# Patient Record
Sex: MALE | Race: WHITE | Employment: FULL TIME | URBAN - METROPOLITAN AREA
[De-identification: names, ages, dates, MRNs, and addresses within clinical notes are randomized per-mention and may not be internally consistent; named-entity substitution may affect disease eponyms.]

---

## 2019-06-24 ENCOUNTER — TELEPHONE (OUTPATIENT)
Dept: PSYCHIATRY | Facility: CLINIC | Age: 34
End: 2019-06-24

## 2019-06-24 NOTE — TELEPHONE ENCOUNTER
PSYCHIATRY CLINIC PHONE INTAKE     SERVICES REQUESTED / INTERESTED IN          Med Management    Presenting Problem and Brief History                              What would you like to be seen for? (brief description):  The patient recently moved to the [facility], and he wants to establish care with psychiatry to get his ADHD under control. He was diagnosed when he was 26 and said that he had testing done. He experiences issues with attention that lead to depression and anxiety.   Have you received a mental health diagnosis? Yes   Which one (s): ADHD  Is there any history of developmental delay?  No   Are you currently seeing a mental health provider?  No             Do you have mental health records elsewhere?  Yes  Will you sign a release so we can obtain them?  Yes    Have you ever been hospitalized for psychiatric reasons?  No      Do you have current thoughts of self-harm?  No    Do you currently have thoughts of harming others?  No       Substance Use History     Do you have any history of alcohol / illicit drug use?  No      Social History     Do you have any current or past legal issues?  No    OK to leave a detailed voicemail?  Yes    Medical/ Surgical History                                  None    Medications             None    DISPOSITION      Completed phone screen with patient. Added to AGE wait list.    -Gretchen Davis,

## 2019-08-15 ENCOUNTER — OFFICE VISIT (OUTPATIENT)
Dept: FAMILY MEDICINE | Facility: CLINIC | Age: 34
End: 2019-08-15
Payer: COMMERCIAL

## 2019-08-15 VITALS
HEIGHT: 71 IN | BODY MASS INDEX: 26.6 KG/M2 | DIASTOLIC BLOOD PRESSURE: 66 MMHG | OXYGEN SATURATION: 98 % | TEMPERATURE: 98.6 F | HEART RATE: 58 BPM | SYSTOLIC BLOOD PRESSURE: 112 MMHG | WEIGHT: 190 LBS

## 2019-08-15 DIAGNOSIS — F32.0 MILD MAJOR DEPRESSION (H): ICD-10-CM

## 2019-08-15 DIAGNOSIS — Z00.00 ROUTINE GENERAL MEDICAL EXAMINATION AT A HEALTH CARE FACILITY: Primary | ICD-10-CM

## 2019-08-15 DIAGNOSIS — F41.1 GAD (GENERALIZED ANXIETY DISORDER): ICD-10-CM

## 2019-08-15 DIAGNOSIS — F98.8 ATTENTION DEFICIT DISORDER, UNSPECIFIED HYPERACTIVITY PRESENCE: ICD-10-CM

## 2019-08-15 LAB
CHOLEST SERPL-MCNC: 154 MG/DL
GLUCOSE SERPL-MCNC: 80 MG/DL (ref 70–99)
HDLC SERPL-MCNC: 45 MG/DL
LDLC SERPL CALC-MCNC: 83 MG/DL
NONHDLC SERPL-MCNC: 109 MG/DL
TRIGL SERPL-MCNC: 129 MG/DL

## 2019-08-15 PROCEDURE — 36415 COLL VENOUS BLD VENIPUNCTURE: CPT | Performed by: FAMILY MEDICINE

## 2019-08-15 PROCEDURE — 99385 PREV VISIT NEW AGE 18-39: CPT | Performed by: FAMILY MEDICINE

## 2019-08-15 PROCEDURE — 82947 ASSAY GLUCOSE BLOOD QUANT: CPT | Performed by: FAMILY MEDICINE

## 2019-08-15 PROCEDURE — 99214 OFFICE O/P EST MOD 30 MIN: CPT | Mod: 25 | Performed by: FAMILY MEDICINE

## 2019-08-15 PROCEDURE — 87389 HIV-1 AG W/HIV-1&-2 AB AG IA: CPT | Performed by: FAMILY MEDICINE

## 2019-08-15 PROCEDURE — 80061 LIPID PANEL: CPT | Performed by: FAMILY MEDICINE

## 2019-08-15 RX ORDER — ESCITALOPRAM OXALATE 10 MG/1
10 TABLET ORAL DAILY
Qty: 30 TABLET | Refills: 1 | Status: SHIPPED | OUTPATIENT
Start: 2019-08-15 | End: 2019-10-19

## 2019-08-15 ASSESSMENT — ANXIETY QUESTIONNAIRES
5. BEING SO RESTLESS THAT IT IS HARD TO SIT STILL: NOT AT ALL
IF YOU CHECKED OFF ANY PROBLEMS ON THIS QUESTIONNAIRE, HOW DIFFICULT HAVE THESE PROBLEMS MADE IT FOR YOU TO DO YOUR WORK, TAKE CARE OF THINGS AT HOME, OR GET ALONG WITH OTHER PEOPLE: SOMEWHAT DIFFICULT
GAD7 TOTAL SCORE: 11
2. NOT BEING ABLE TO STOP OR CONTROL WORRYING: MORE THAN HALF THE DAYS
1. FEELING NERVOUS, ANXIOUS, OR ON EDGE: MORE THAN HALF THE DAYS
3. WORRYING TOO MUCH ABOUT DIFFERENT THINGS: MORE THAN HALF THE DAYS
7. FEELING AFRAID AS IF SOMETHING AWFUL MIGHT HAPPEN: MORE THAN HALF THE DAYS
6. BECOMING EASILY ANNOYED OR IRRITABLE: MORE THAN HALF THE DAYS

## 2019-08-15 ASSESSMENT — PATIENT HEALTH QUESTIONNAIRE - PHQ9
5. POOR APPETITE OR OVEREATING: SEVERAL DAYS
SUM OF ALL RESPONSES TO PHQ QUESTIONS 1-9: 11

## 2019-08-15 ASSESSMENT — MIFFLIN-ST. JEOR: SCORE: 1837.92

## 2019-08-15 NOTE — PROGRESS NOTES
SUBJECTIVE:   CC: Roberto Marvin is an 34 year old male who presents for a preventive health visit and to establish primary care and to some underlying health concerns.     Healthy Habits:    Do you get at least three servings of calcium containing foods daily (dairy, green leafy vegetables, etc.)? yes    Amount of exercise or daily activities, outside of work: 3 day(s) per week    Problems taking medications regularly not applicable    Medication side effects: No    Have you had an eye exam in the past two years? yes    Do you see a dentist twice per year? yes    Do you have sleep apnea, excessive snoring or daytime drowsiness?no      Depression or Anxiety - New Diagnosis   Duration of complaint: 3-4 months, couple days a week.    He is new to me and our clinic.  He and his wife moved to the area earlier this year from [place].  His wife is doing postdoctoral work in evolutionary biology.  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  He and his wife are expecting their first child on September 3.  He has been dealing with some stress with the move in the upcoming baby.  He has had some feelings of depression at times.  He has felt anxious much of the time.  He dealt with some of these feelings back in high school.  He saw a therapist for a while.  He does not recall if he took any medication.  He does report a history of ADD.  He is not on any medication for that.    He generally feels well physically.  He is on no routine meds.  He did get a Tdap booster a week ago.    Today's PHQ-2 Score:   PHQ-2 ( 1999 Pfizer) 8/15/2019   Q1: Little interest or pleasure in doing things 0   Q2: Feeling down, depressed or hopeless 1   PHQ-2 Score 1       Abuse: Current or Past(Physical, Sexual or Emotional)- No  Do you feel safe in your environment? Yes    Social History     Tobacco Use     Smoking status: Former Smoker     Smokeless tobacco: Never Used   Substance Use Topics  "    Alcohol use: Not Currently     If you drink alcohol do you typically have >3 drinks per day or >7 drinks per week? No                      Last PSA: No results found for: PSA    Reviewed orders with patient. Reviewed health maintenance and updated orders accordingly - Yes  Patient Active Problem List   Diagnosis     Mild major depression (H)     DARBY (generalized anxiety disorder)     Attention deficit disorder, unspecified hyperactivity presence     History reviewed. No pertinent surgical history.    Social History     Tobacco Use     Smoking status: Former Smoker     Smokeless tobacco: Never Used   Substance Use Topics     Alcohol use: Not Currently     History reviewed. No pertinent family history.      Current Outpatient Medications   Medication Sig Dispense Refill    none   1     No Known Allergies    Reviewed and updated as needed this visit by clinical staff  Tobacco  Allergies  Meds  Med Hx  Surg Hx  Fam Hx  Soc Hx        Reviewed and updated as needed this visit by Provider            ROS:  CONSTITUTIONAL: NEGATIVE for fever, chills, change in weight  INTEGUMENTARY/SKIN: NEGATIVE for worrisome rashes, moles or lesions  EYES: NEGATIVE for vision changes or irritation  ENT: NEGATIVE for ear, mouth and throat problems  RESP: NEGATIVE for significant cough or SOB  CV: NEGATIVE for chest pain, palpitations or peripheral edema  GI: NEGATIVE for nausea, abdominal pain, heartburn, or change in bowel habits   male: negative for dysuria, hematuria, decreased urinary stream, erectile dysfunction, urethral discharge  MUSCULOSKELETAL: NEGATIVE for significant arthralgias or myalgia  NEURO: NEGATIVE for weakness, dizziness or paresthesias  PSYCHIATRIC: See above    OBJECTIVE:   /66 (BP Location: Right arm, Patient Position: Chair, Cuff Size: Adult Regular)   Pulse 58   Temp 98.6  F (37  C) (Oral)   Ht 1.81 m (5' 11.25\")   Wt 86.2 kg (190 lb)   SpO2 98%   BMI 26.31 kg/m    EXAM:  GENERAL: healthy, " alert and no distress  EYES: Eyes grossly normal to inspection, PERRL and conjunctivae and sclerae normal  HENT: ear canals and TM's normal, nose and mouth without ulcers or lesions  NECK: no adenopathy, no asymmetry, masses, or scars and thyroid normal to palpation  RESP: lungs clear to auscultation - no rales, rhonchi or wheezes  CV: regular rate and rhythm, normal S1 S2, no S3 or S4, no murmur, click or rub, no peripheral edema and peripheral pulses strong  ABDOMEN: soft, nontender, no hepatosplenomegaly, no masses   MS: no gross musculoskeletal defects noted, no edema  SKIN: He has a few benign-appearing nevi on his back.  His arms are both heavily tattooed.  NEURO: Normal strength and tone, mentation intact and speech normal  PSYCH: mentation appears normal, affect normal/bright.  He scored an 11 on his DARBY 7 and an 11 on the PHQ 9.    Diagnostic Test Results:  none     ASSESSMENT/PLAN:       ICD-10-CM    1. Routine general medical examination at a health care facility Z00.00 Glucose     Lipid panel reflex to direct LDL Non-fasting     HIV Antigen Antibody Combo   2. Mild major depression (H) F32.0 escitalopram (LEXAPRO) 10 MG tablet     MENTAL HEALTH REFERRAL  - Adult; Outpatient Treatment; Individual/Couples/Family/Group Therapy/Health Psychology[place] [facility] [place] [facility] [phone]; We will contact you to schedule the appointment or please call with any questions   3. DARBY (generalized anxiety disorder) F41.1 escitalopram (LEXAPRO) 10 MG tablet     MENTAL HEALTH REFERRAL  - Adult; Outpatient Treatment; Individual/Couples/Family/Group Therapy/Health Psychology[place] [facility] [place] [facility] [phone]; We will contact you to schedule the appointment or please call with any questions   4. Attention deficit disorder, unspecified hyperactivity presence F98.8 MENTAL HEALTH REFERRAL  - Adult; Outpatient Treatment; Individual/Couples/Family/Group Therapy/Health Psychology[place] [facility] [place] [facility]  "[facility] [phone]; We will contact you to schedule the appointment or please call with any questions     Blood pressure and other vital signs look good  We will check some nonfasting screening labs as above  We discussed his anxiety and depression and ADD and I will make a mental health referral for him to receive some counseling for that  We also discussed pharmacotherapy and we will try him on Lexapro for the anxiety and depression  I advised a follow-up visit with me in about 6 weeks    COUNSELING:  Reviewed preventive health counseling, as reflected in patient instructions       Regular exercise       Healthy diet/nutrition    Estimated body mass index is 26.31 kg/m  as calculated from the following:    Height as of this encounter: 1.81 m (5' 11.25\").    Weight as of this encounter: 86.2 kg (190 lb).    Weight management plan: Discussed healthy diet and exercise guidelines     reports that he has quit smoking. He has never used smokeless tobacco.      Counseling Resources:  ATP IV Guidelines  Pooled Cohorts Equation Calculator  FRAX Risk Assessment  ICSI Preventive Guidelines  Dietary Guidelines for Americans, 2010  USDA's MyPlate  ASA Prophylaxis  Lung CA Screening    Hai Damon MD  [facility]  "

## 2019-08-16 LAB — HIV 1+2 AB+HIV1 P24 AG SERPL QL IA: NONREACTIVE

## 2019-08-16 ASSESSMENT — ANXIETY QUESTIONNAIRES: GAD7 TOTAL SCORE: 11

## 2019-08-16 NOTE — RESULT ENCOUNTER NOTE
Roberto,  These lab results are all nice and normal including cholesterol values, blood sugar, and HIV screening test.    Hai Damon MD

## 2019-10-19 ENCOUNTER — MYC REFILL (OUTPATIENT)
Dept: FAMILY MEDICINE | Facility: CLINIC | Age: 34
End: 2019-10-19

## 2019-10-19 DIAGNOSIS — F41.1 GAD (GENERALIZED ANXIETY DISORDER): ICD-10-CM

## 2019-10-19 DIAGNOSIS — F32.0 MILD MAJOR DEPRESSION (H): ICD-10-CM

## 2019-10-21 RX ORDER — ESCITALOPRAM OXALATE 10 MG/1
10 TABLET ORAL DAILY
Qty: 30 TABLET | Refills: 0 | Status: SHIPPED | OUTPATIENT
Start: 2019-10-21 | End: 2019-12-05

## 2019-10-21 NOTE — TELEPHONE ENCOUNTER
"Requested Prescriptions   Pending Prescriptions Disp Refills     escitalopram (LEXAPRO) 10 MG tablet 30 tablet 1     Sig: Take 1 tablet (10 mg) by mouth daily       SSRIs Protocol Failed - 10/19/2019  6:41 PM        Failed - PHQ-9 score less than 5 in past 6 months     Please review last PHQ-9 score.           Passed - Medication is active on med list        Passed - Patient is age 18 or older        Passed - Recent (6 mo) or future (30 days) visit within the authorizing provider's specialty     Patient had office visit in the last 6 months or has a visit in the next 30 days with authorizing provider or within the authorizing provider's specialty.  See \"Patient Info\" tab in inbasket, or \"Choose Columns\" in Meds & Orders section of the refill encounter.              "

## 2019-10-21 NOTE — TELEPHONE ENCOUNTER
Routing refill request to provider for review/approval because:  Failed - PHQ-9 score less than 5 in past 6 months  Patient also instructed to follow up in 6 weeks from last OV, no appointment scheduled.     Flagged for TC to help schedule patient.      Lani Bonilla, RN, BSN, PHN  [facility]: [place]

## 2019-10-23 ENCOUNTER — MYC REFILL (OUTPATIENT)
Dept: FAMILY MEDICINE | Facility: CLINIC | Age: 34
End: 2019-10-23

## 2019-10-23 DIAGNOSIS — F41.1 GAD (GENERALIZED ANXIETY DISORDER): ICD-10-CM

## 2019-10-23 DIAGNOSIS — F32.0 MILD MAJOR DEPRESSION (H): ICD-10-CM

## 2019-10-24 RX ORDER — ESCITALOPRAM OXALATE 10 MG/1
10 TABLET ORAL DAILY
Qty: 30 TABLET | Refills: 0 | OUTPATIENT
Start: 2019-10-24

## 2019-10-24 NOTE — TELEPHONE ENCOUNTER
"Requested Prescriptions   Pending Prescriptions Disp Refills     escitalopram (LEXAPRO) 10 MG tablet 30 tablet 0     Sig: Take 1 tablet (10 mg) by mouth daily       SSRIs Protocol Failed - 10/23/2019  3:55 PM        Failed - PHQ-9 score less than 5 in past 6 months     Please review last PHQ-9 score.           Passed - Medication is active on med list        Passed - Patient is age 18 or older        Passed - Recent (6 mo) or future (30 days) visit within the authorizing provider's specialty     Patient had office visit in the last 6 months or has a visit in the next 30 days with authorizing provider or within the authorizing provider's specialty.  See \"Patient Info\" tab in inbasket, or \"Choose Columns\" in Meds & Orders section of the refill encounter.              "

## 2019-10-24 NOTE — TELEPHONE ENCOUNTER
PHQ-9 score:    PHQ-9 SCORE 8/15/2019   PHQ-9 Total Score 11       Routing refill request to provider for review/approval because:  PHQ9 needs review    Lani Bonilla RN, BSN, PHN  [facility]: [place]

## 2019-11-07 ENCOUNTER — OFFICE VISIT (OUTPATIENT)
Dept: PSYCHOLOGY | Facility: CLINIC | Age: 34
End: 2019-11-07
Attending: FAMILY MEDICINE
Payer: COMMERCIAL

## 2019-11-07 DIAGNOSIS — F32.0 MILD MAJOR DEPRESSION (H): Primary | ICD-10-CM

## 2019-11-07 DIAGNOSIS — F41.1 GAD (GENERALIZED ANXIETY DISORDER): ICD-10-CM

## 2019-11-07 PROCEDURE — 90834 PSYTX W PT 45 MINUTES: CPT | Performed by: SOCIAL WORKER

## 2019-11-07 ASSESSMENT — PATIENT HEALTH QUESTIONNAIRE - PHQ9
5. POOR APPETITE OR OVEREATING: SEVERAL DAYS
SUM OF ALL RESPONSES TO PHQ QUESTIONS 1-9: 6

## 2019-11-07 ASSESSMENT — ANXIETY QUESTIONNAIRES
7. FEELING AFRAID AS IF SOMETHING AWFUL MIGHT HAPPEN: SEVERAL DAYS
2. NOT BEING ABLE TO STOP OR CONTROL WORRYING: NOT AT ALL
IF YOU CHECKED OFF ANY PROBLEMS ON THIS QUESTIONNAIRE, HOW DIFFICULT HAVE THESE PROBLEMS MADE IT FOR YOU TO DO YOUR WORK, TAKE CARE OF THINGS AT HOME, OR GET ALONG WITH OTHER PEOPLE: SOMEWHAT DIFFICULT
GAD7 TOTAL SCORE: 4
1. FEELING NERVOUS, ANXIOUS, OR ON EDGE: SEVERAL DAYS
3. WORRYING TOO MUCH ABOUT DIFFERENT THINGS: NOT AT ALL
6. BECOMING EASILY ANNOYED OR IRRITABLE: SEVERAL DAYS
5. BEING SO RESTLESS THAT IT IS HARD TO SIT STILL: NOT AT ALL

## 2019-11-07 NOTE — Clinical Note
Hal Castellon-I started with your patient for counseling services.  I will complete the diagnostic assessment at our next session.  Let me know if you have any questions.  Best, Deng

## 2019-11-08 ASSESSMENT — ANXIETY QUESTIONNAIRES: GAD7 TOTAL SCORE: 4

## 2019-11-08 NOTE — PROGRESS NOTES
Progress Note - Initial Session    Client Name:  Roberto Bassadnaomi Date: 11-07-19         Service Type: Individual  Video Visit: No     Session Start Time: 4:30  Session End Time: 5:15     Session Length: 45    Session #: 1    Attendees: Client     DATA:  Diagnostic Assessment in progress.  Unable to complete documentation at the conclusion of the first session due to extensive social hx.     Interactive Complexity: No  Crisis: No    Intervention:  Motivational Interviewing: [facility]    ASSESSMENT:  Mental Status Assessment:  Appearance:   Appropriate   Eye Contact:   Fair   Psychomotor Behavior: Restless   Attitude:   Cooperative   Orientation:   All  Speech   Rate / Production: Normal    Volume:  Soft   Mood:    Anxious  Depressed   Affect:    Restricted  Subdued  Worrisome   Thought Content:  Rumination   Thought Form:  Coherent  Goal Directed  Logical   Insight:    Fair       Safety Issues and Plan for Safety and Risk Management:  Client denies current fears or concerns for personal safety.  Client denies current or recent suicidal ideation or behaviors.  Client denies current or recent homicidal ideation or behaviors.  Client denies current or recent self injurious behavior or ideation.  Client denies other safety concerns.  Recommended that patient call 911 or go to the local ED should there be a change in any of these risk factors.  Client reports there are no firearms in the house.      Diagnostic Criteria:  A. Excessive anxiety and worry about a number of events or activities (such as work or school performance).   B. The person finds it difficult to control the worry.   - Restlessness or feeling keyed up or on edge.    - Being easily fatigued.    - Difficulty concentrating or mind going blank.    - Irritability.    - Muscle tension.    - Sleep disturbance (difficulty falling or staying asleep, or restless unsatisfying sleep).   D. The focus of the anxiety and worry is not confined  to features of an Axis I disorder.  E. The anxiety, worry, or physical symptoms cause clinically significant distress or impairment in social, occupational, or other important areas of functioning.   F. The disturbance is not due to the direct physiological effects of a substance (e.g., a drug of abuse, a medication) or a general medical condition (e.g., hyperthyroidism) and does not occur exclusively during a Mood Disorder, a Psychotic Disorder, or a Pervasive Developmental Disorder.  A) Recurrent episode(s) - symptoms have been present during the same 2-week period and represent a change from previous functioning 5 or more symptoms (required for diagnosis)   - Depressed mood. Note: In children and adolescents, can be irritable mood.     - Diminished interest or pleasure in all, or almost all, activities.    - Decreased sleep.    - Psychomotor activity agitation.    - Fatigue or loss of energy.    - Feelings of worthlessness or inappropriate and excessive guilt.    - Diminished ability to think or concentrate, or indecisiveness.   B) The symptoms cause clinically significant distress or impairment in social, occupational, or other important areas of functioning  C) The episode is not attributable to the physiological effects of a substance or to another medical condition  D) The occurence of major depressive episode is not better explained by other thought / psychotic disorders  E) There has never been a manic episode or hypomanic episode      DSM5 Diagnoses: (Sustained by DSM5 Criteria Listed Above)  Diagnoses: 296.31 (F33.0) Major Depressive Disorder, Recurrent Episode, Mild _ and With anxious distress  300.02 (F41.1) Generalized Anxiety Disorder  Attention-Deficit/Hyperactivity Disorder  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 Health conditions  include  disease or illnesses, other health problems that may be short or long lasting,  injuries, mental health or emotional problems, and problems with alcohol or drugs.                     Think back over the past 30 days and answer these questions, thinking about how much  difficulty you had doing the following activities. For each question, please [place] only  one response.    S1 Standing for long periods such as 30 minutes? None =         1   S2 Taking care of household responsibilities? None =         1   S3 Learning a new task, for example, learning how to get to a new place? None =         1   S4 How much of a problem do you have joining community activities (for example, festivals, Confucianism or other activities) in the same way as anyone else can? None =         1   S5 How much have you been emotionally affected by your health problems? Mild =           2     In the past 30 days, how much difficulty did you have in:   S6 Concentrating on doing something for ten minutes? Mild =           2   S7 Walking a long distance such as a kilometer (or equivalent)? Mild =           2   S8 Washing your whole body? None =         1   S9 Getting dressed? None =         1   S10 Dealing with people you do not know? Mild =           2   S11 Maintaining a friendship? None =         1   S12 Your day to day work? Mild =           2     H1 Overall, in the past 30 days, how many days were these difficulties present? Record number of days 10   H2 In the past 30 days, for how many days were you totally unable to carry out your usual activities or work because of any health condition? Record number of days  0   H3 In the past 30 days, not counting the days that you were totally unable, for how many days did you cut back or reduce your usual activities or work because of any health condition? Record number  of days 0       Collateral Reports Completed:  Routed note to PCP      PLAN: (Homework, other):  Client scheduled ongoing services with [facility].  Start day out with meditation, listening to music or relaxation exercise, continue to go to the gym, use thought stopping process when negative thoughts appear journal triggers to mood.       SCOTT Huff

## 2019-11-18 ENCOUNTER — OFFICE VISIT (OUTPATIENT)
Dept: PSYCHOLOGY | Facility: CLINIC | Age: 34
End: 2019-11-18
Payer: COMMERCIAL

## 2019-11-18 DIAGNOSIS — F32.0 MILD MAJOR DEPRESSION (H): Primary | ICD-10-CM

## 2019-11-18 DIAGNOSIS — F41.1 GAD (GENERALIZED ANXIETY DISORDER): ICD-10-CM

## 2019-11-18 DIAGNOSIS — F90.0 ATTENTION DEFICIT HYPERACTIVITY DISORDER (ADHD), PREDOMINANTLY INATTENTIVE TYPE: ICD-10-CM

## 2019-11-18 PROCEDURE — 90791 PSYCH DIAGNOSTIC EVALUATION: CPT | Performed by: SOCIAL WORKER

## 2019-11-18 NOTE — PROGRESS NOTES
"  [facility]    PATIENT'S NAME: Roberto Marvin  PREFERRED NAME: Roberto  PREFERRED PRONOUNS: He/Him/His/Himself  MRN:   [id number]  :   1985  [id number]  DATE OF SERVICE: 19  START TIME: 10:00  END TIME: 10:45  PREFERRED PHONE: [phone]  May we leave a program related message: Yes    STANDARD DIAGNOSTIC ASSESSMENT    VIDEO VISIT: No    Identifying Information:  Patient is a 34 year old, .  The pronoun use throughout this assessment reflects the sex of the patient at birth.  Patient was referred for an assessment by primary care provider.  Patient attended the session alone.     The patient describes their cultural background as white, middle class male.   Cultural influences and impact on patient's life structure, values, norms, and healthcare: solid and healthy background.  The patient reports there are no ethnic, cultural or Muslim factors that may be relevant for therapy.  Patient identified his preferred language to be English. Patient reported he does not need the assistance of an  or other support involved in therapy. Modifications will not be used to assist communication in therapy.   Patient reports he is able to understand written materials.    Chief Complaint:   The reason for seeking services at this time is: \" Having bouts of depression and anxiety \"  Patient has a stressful position and this creates anxiety for patient.  He has had panic attacks in his past but nothing recently.  Patient is a new father, worries about many things in his life, and often prepares for anything that might happen so he can anticipate how to handle any situation or issue that could possibly come up.      History of Presenting Concern:  The problem(s) began when he was in grade school. Patient has not attempted to resolve these concerns in the past. Patient reports that other professional(s) are currently involved in providing support / services.  PCP is " involved with his care.    Social/Family History:  Patient reported he grew up in [place] [place]. .  They were raised by biological parents.  They were the second born of 2 children. Patient has older brother who is 8 years older than patient. Parents  21 years ago when the client was 13 years old. The client's mother did remarry 14 years ago The client's father did remarry 23 years ago Patient reported that his childhood was difficult at times.  Patient was held back a grade when he was in first grade and experienced bullying. Divorce ws difficult for patient when he was 13 years old.  Patient  was not close to older brother due to age difference.  Patient described his current relationships with family of origin as close.      Patient's highest education level was college graduate. Patient did identify the following learning problems: attention and concentration. Patient was diagnosed with ADHD when he was 21 years old.     Patient reported the following relationship history Patient had two year relationship in high school.  Patient's current relationship status is  for 6 years and together for 11 years.    Patient identified their sexual orientation as heterosexual.  Patient reported having 1 child a son three months old.     Patient's current living/housing situation involves owning a home.  Patient identified mother, father, friends, spouse and sibling, step parents  as part of their support system.  Patient identified the quality of these relationships as stable and meaningful.      Patient is currently employed full time and reports they are able to function appropriately at work..  Patient did not serve in the .  Patient reports their finances are obtained through employment.  Patient does not identify finances as a current stressor.      Patient reported that he has not been involved with the legal system.   Patient denies being on probation / parole / under the jurisdiction of the  court.    Medical Issues:  Patient reports family history is not on file.    Patient has had a physical exam to rule out medical causes for current symptoms.  Date of last physical exam was within the past year. Client was encouraged to follow up with PCP if symptoms were to develop. The patient has a [place] [facility] Provider, who is named Hai Damon, [facility].  Patient reports no current medical concerns.  They did not report dental concerns.  There are not significant appetite / nutritional concerns / weight changes.  The patient has not been diagnosed with an eating disorder.  The patient denies the presence of chronic or episodic pain.  Patient does not report a history of head injury / trauma / cognitive impairment.  Patient has been diagnosed with ADHD and it is the inattentive type and he at times struggles with concentration and focus but states that he has learned to compensate for this in his work and everyday life.      Patient reports current meds as:   No outpatient medications have been marked as taking for the 11/18/19 encounter (Appointment) with Deng Weiner LICSW.       Medication Adherence:  Patient reports taking prescribed medications as prescribed    Patient Allergies:  No Known Allergies    Medical History:  No past medical history on file.    Mental Health History:  Patient did report a family history of mental health concerns; patient reported brother also had anxiety.   Patient previously received the following mental health diagnosis: ADHD, Anxiety and Depression.  Patient reported symptoms began childhood to current age.   Patient has received the following mental health services in the past: Diagnosed with ADHD in first years of college; [age] years old.  Hospitalizations: None.  Patient denies a history of civil commitment.  Patient is currently receiving the following services: PCP support.        Current Mental Status Exam:   Appearance:  Appropriate   Eye  Contact:  Good   Psychomotor:  Normal       Gait / station:  no problem  Attitude / Demeanor: Cooperative   Speech      Rate / Production: Normal       Volume:  Soft  volume      Language:  Rate/Production: Normal    Mood:   Anxious  Depressed   Affect:   Blunted  Subdued  Worrisome   Thought Content: Clear  Rumination   Thought Process: Coherent  Goal Directed  Logical       Associations: Volume: Soft    Insight:   Good   Judgment:  Intact   Orientation:  All  Attention/concentration: Fair, Limited and Easily Distracted      Review of Symptoms:  Depression: Change in sleep, Lack of interest, Excessive or inappropriate guilt, Change in energy level, Difficulties concentrating, Psychomotor slowing or agitation, Low self-worth, Ruminations and Feling sad, down, or depressed  Mary Kay:  No Symptoms  Psychosis: No Symptoms  Anxiety: Excessive worry, Nervousness, Social anxiety, Fears/phobias travel-flying, Sleep disturbance, Ruminations and Poor concentration  Panic:  Shortness of breath, Sense of impending doom and Triggers when overwhelmed and feels like he can not handle things; has not had a recent panic attack  Post Traumatic Stress Disorder: No Symptoms  Eating Disorder: No Symptoms  Oppositional Defiant Disorder:  No Symptoms  ADD / ADHD:  Inattentive, Poor organizational skills, Distractibility and Forgetful  Conduct Disorder: No symptoms  Autism Spectrum Disorder: No symptoms  Obsessive Compulsive Disorder: No Symptoms  Other Compulsive Behaviors: N/A   Substance Use: No symptoms    Rating Scales:  PHQ9     PHQ-9 SCORE 8/15/2019 11/7/2019   PHQ-9 Total Score 11 6     GAD7     DARBY-7 SCORE 8/15/2019 11/7/2019   Total Score 11 4     CGI   Clinical Global Impressions  Initial result:4/4     Most recent result:       Substance Use History:  Patient did not report a family history of substance use concerns; see medical history section for details.  Patient has not received chemical dependency treatment in the past.   Patient has not ever been to detox.      Patient is not currently receiving any chemical dependency treatment. Patient reported the following problems as a result of their substance use: N/A.     Patient reports using alcohol 2 times per week and has 1 to 2 mixed drinks at a time. Patient first started drinking at age 20.  Patient reported date of last use was last week.  Patient reports heaviest use was several weeks ago and in the past.  Patient denies using tobacco.  Patient denies using marijuana.  Patient reports using caffeine 1 times per day and drinks 1 at a time. Patient started using caffeine at age 22.  Patient denies cocaine/crack use.  Patient denies meth/amphetamine use.  Patient denies use of heroin  Patient denies use of other opiates.  Patient denies inhalant use  Patient denies use of benzodiazepines.  Patient denies use of hallucinogens.  Patient denies use of barbiturates, sedatives, or hypnotics.  Patient denies use of over the counter drugs.  Patient denies use of other substances.    No flowsheet data found.    Patient is not concerned about substance use.       Based on the positive CAGE score and clinical interview there  Patient has decided to cut down on alcohol use within last few weeks and also cut down on caffeine use.  Does not report any concerns at this time.  .    Significant Losses / Trauma / Abuse / Neglect Issues:   There are no indications or report of: significant losses, trauma, abuse or neglect    Concerns for possible neglect are not present.     Safety Assessment:  Current Safety Concerns:  [place] Suicide Severity Rating Scale (Short Version)  [place] Suicide Severity Rating (Short Version) 11/18/2019   Over the past 2 weeks have you felt down, depressed, or hopeless? yes   Over the past 2 weeks have you had thoughts of killing yourself? no   Have you ever attempted to kill yourself? no     Patient denies current homicidal ideation and behaviors.  Patient denies current  self-injurious ideation and behaviors.    Patient denied risk behaviors associated with substance use.  Patient denies any high risk behaviors associated with mental health symptoms.  Patient reports the following current concerns for their personal safety: None.  Patient reports there are no firearms in the house.     History of Safety Concerns:  Patient denied a history of homicidal ideation.     Patient denied a history of self-injurious ideation and behaviors.    Patient denied a history of personal safety concerns.    Patient denied a history of assaultive behaviors.    Patient denied a history of assaultive behaviors.    Patient denied a history of risk behaviors associated with substance use.  Patient denies any history of high risk behaviors associated with mental health symptoms.    Patient reports the following protective factors: positive relationships positive social network and positive family connections, dedication to family/friends, safe and stable environment, secure attachment, committment to well-being and sense of meaning    See Preliminary Treatment Plan for Safety and Risk Management Plan    Patient's Strengths and Limitations:  Patient identified the following strengths or resources that will help him succeed in treatment: commitment to health and well being, friends / good social support, family support, insight and intelligence. Things that may interfere with the patient's success in treatment include: stress and at time lack of motivation.     Diagnostic Criteria:  A. Excessive anxiety and worry about a number of events or activities (such as work or school performance).   B. The person finds it difficult to control the worry.  C. Select 3 or more symptoms (required for diagnosis). Only one item is required in children.   - Restlessness or feeling keyed up or on edge.    - Being easily fatigued.    - Difficulty concentrating or mind going blank.    - Irritability.    - Muscle tension.    -  Sleep disturbance (difficulty falling or staying asleep, or restless unsatisfying sleep).   D. The focus of the anxiety and worry is not confined to features of an Axis I disorder.  E. The anxiety, worry, or physical symptoms cause clinically significant distress or impairment in social, occupational, or other important areas of functioning.   F. The disturbance is not due to the direct physiological effects of a substance (e.g., a drug of abuse, a medication) or a general medical condition (e.g., hyperthyroidism) and does not occur exclusively during a Mood Disorder, a Psychotic Disorder, or a Pervasive Developmental Disorder.  A) Recurrent episode(s) - symptoms have been present during the same 2-week period and represent a change from previous functioning 5 or more symptoms (required for diagnosis)   - Depressed mood. Note: In children and adolescents, can be irritable mood.     - Diminished interest or pleasure in all, or almost all, activities.    - Decreased sleep.    - Psychomotor activity agitation.    - Fatigue or loss of energy.    - Feelings of worthlessness or inappropriate and excessive guilt.    - Diminished ability to think or concentrate, or indecisiveness.   B) The symptoms cause clinically significant distress or impairment in social, occupational, or other important areas of functioning  C) The episode is not attributable to the physiological effects of a substance or to another medical condition  D) The occurence of major depressive episode is not better explained by other thought / psychotic disorders  E) There has never been a manic episode or hypomanic episode  A) A persistent pattern of inattention and/or hyperactivity-impulsivity that interferes with functioning or development, as characterized by (1) Inattention and/or (2) Hyperactivity and Impulsivity  (1) Inattention: 6 or more of the following symptoms have persisted for at least 6 months to a degree that is inconsistent with  developmental level and that negatively impacts directly on social and academic/occupational activities:  - Often fails to give close attention to details or makes careless mistakes in schoolwork, at work, or during other activities  - Often has difficulty sustaining attention in tasks or play activities  - Often does not seem to listen when spoken to directly  - Often does not follow through on instructions and fails to finish schoolwork, chores, or duties in the workplace  - Often has difficulty organizing tasks and activities  - Often avoids, dislikes, or is reluctant to engage in tasks that require sustained mental effort  - Often loses things necessary for tasks or activities  - Is often easily distractedby extraneous stimuli  D) There is clear evidence that the symptoms interfere with, or reduce the quality of, social academic, or occupational functioning    Functional Status:  Patient's  symptoms have resulted in the following functional impairments: social interactions and work / vocational responsibilities    DSM5 Diagnoses: (Sustained by DSM5 Criteria Listed Above)  Diagnoses: Attention-Deficit/Hyperactivity Disorder  314.00 (F90.0) Predominantly inattentive presentation  296.31 (F33.0) Major Depressive Disorder, Recurrent Episode, Mild _ and With anxious distress  300.02 (F41.1) Generalized Anxiety Disorder   Psychosocial & Contextual Factors: work stress, at times lack of confidence, social anxiety, attention and focus issues, assertiveness issues  WHODAS: 17    Preliminary Treatment Plan:  Plan for Safety and Risk Management:   Recommended that patient call 911 or go to the local ED should there be a change in any of these risk factors.     Collaboration:  Collaboration / coordination of treatment will be initiated with the following support professionals: primary care physician.    Referral to another professional/service is not indicated at this time..  A Release of Information is not needed at this  time.     Patient's identified no cultural concerns at this time    Initial Treatment will focus on: Depressed Mood - decrease depression symptoms and return to normal daily functioning  Anxiety - alleviate anxiety and return to normal daily functioning.     Resources/Service Plan:       services are not indicated.     Modifications to assist communication are not indicated.     Additional disability accomodations are not indicated     Discussed the general effects of drugs and alcohol on health and well-being. Provider gave patient printed information about the effects of chemical use on his health and well being.    Records were reviewed at time of assessment.    Report to child / adult protection services was NA.    Information in this assessment was obtained from the medical record and provided by patient who is a good historian.     Patient will have open access to their mental health medical record.    SCOTT Huff  November 18, 2019

## 2019-12-05 ENCOUNTER — OFFICE VISIT (OUTPATIENT)
Dept: FAMILY MEDICINE | Facility: CLINIC | Age: 34
End: 2019-12-05
Payer: COMMERCIAL

## 2019-12-05 VITALS
WEIGHT: 186 LBS | HEART RATE: 54 BPM | TEMPERATURE: 98.6 F | SYSTOLIC BLOOD PRESSURE: 120 MMHG | DIASTOLIC BLOOD PRESSURE: 69 MMHG | BODY MASS INDEX: 25.76 KG/M2 | OXYGEN SATURATION: 97 %

## 2019-12-05 DIAGNOSIS — F41.1 GAD (GENERALIZED ANXIETY DISORDER): Primary | ICD-10-CM

## 2019-12-05 DIAGNOSIS — F32.0 MILD MAJOR DEPRESSION (H): ICD-10-CM

## 2019-12-05 PROCEDURE — 99213 OFFICE O/P EST LOW 20 MIN: CPT | Performed by: FAMILY MEDICINE

## 2019-12-05 RX ORDER — HYDROXYZINE HYDROCHLORIDE 25 MG/1
25 TABLET, FILM COATED ORAL 3 TIMES DAILY PRN
Qty: 30 TABLET | Refills: 2 | Status: SHIPPED | OUTPATIENT
Start: 2019-12-05 | End: 2021-04-05

## 2019-12-05 ASSESSMENT — PATIENT HEALTH QUESTIONNAIRE - PHQ9
SUM OF ALL RESPONSES TO PHQ QUESTIONS 1-9: 3
5. POOR APPETITE OR OVEREATING: SEVERAL DAYS

## 2019-12-05 ASSESSMENT — ANXIETY QUESTIONNAIRES
5. BEING SO RESTLESS THAT IT IS HARD TO SIT STILL: NOT AT ALL
7. FEELING AFRAID AS IF SOMETHING AWFUL MIGHT HAPPEN: MORE THAN HALF THE DAYS
IF YOU CHECKED OFF ANY PROBLEMS ON THIS QUESTIONNAIRE, HOW DIFFICULT HAVE THESE PROBLEMS MADE IT FOR YOU TO DO YOUR WORK, TAKE CARE OF THINGS AT HOME, OR GET ALONG WITH OTHER PEOPLE: SOMEWHAT DIFFICULT
3. WORRYING TOO MUCH ABOUT DIFFERENT THINGS: SEVERAL DAYS
6. BECOMING EASILY ANNOYED OR IRRITABLE: MORE THAN HALF THE DAYS
1. FEELING NERVOUS, ANXIOUS, OR ON EDGE: SEVERAL DAYS
2. NOT BEING ABLE TO STOP OR CONTROL WORRYING: MORE THAN HALF THE DAYS
GAD7 TOTAL SCORE: 9

## 2019-12-05 NOTE — LETTER
My Depression Action Plan  Name: Roberto Marvin   Date of Birth 1985  Date: 12/5/2019    My doctor: Hai Damon   My clinic: [address] [address] [phone]          GREEN    ZONE   Good Control    What it looks like:     Things are going generally well. You have normal up s and down s. You may even feel depressed from time to time, but bad moods usually last less than a day.   What you need to do:  1. Continue to care for yourself (see self care plan)  2. Check your depression survival kit and update it as needed  3. Follow your physician s recommendations including any medication.  4. Do not stop taking medication unless you consult with your physician first.           YELLOW         ZONE Getting Worse    What it looks like:     Depression is starting to interfere with your life.     It may be hard to get out of bed; you may be starting to isolate yourself from others.    Symptoms of depression are starting to last most all day and this has happened for several days.     You may have suicidal thoughts but they are not constant.   What you need to do:     1. Call your care team, your response to treatment will improve if you keep your care team informed of your progress. Yellow periods are signs an adjustment may need to be made.     2. Continue your self-care, even if you have to fake it!    3. Talk to someone in your support network    4. Open up your depression survival kit           RED    ZONE Medical Alert - Get Help    What it looks like:     Depression is seriously interfering with your life.     You may experience these or other symptoms: You can t get out of bed most days, can t work or engage in other necessary activities, you have trouble taking care of basic hygiene, or basic responsibilities, thoughts of suicide or death that will not go away, self-injurious behavior.     What you need to do:  1. Call  your care team and request a same-day appointment. If they are not available (weekends or after hours) call your local crisis line, emergency room or 911.            Depression Self Care Plan / Survival Kit    Self-Care for Depression  Here s the deal. Your body and mind are really not as separate as most people think.  What you do and think affects how you feel and how you feel influences what you do and think. This means if you do things that people who feel good do, it will help you feel better.  Sometimes this is all it takes.  There is also a place for medication and therapy depending on how severe your depression is, so be sure to consult with your medical provider and/ or Behavioral Health Consultant if your symptoms are worsening or not improving.     In order to better manage my stress, I will:    Exercise  Get some form of exercise, every day. This will help reduce pain and release endorphins, the  feel good  chemicals in your brain. This is almost as good as taking antidepressants!  This is not the same as joining a gym and then never going! (they count on that by the way ) It can be as simple as just going for a walk or doing some gardening, anything that will get you moving.      Hygiene   Maintain good hygiene (Get out of bed in the morning, Make your bed, Brush your teeth, Take a shower, and Get dressed like you were going to work, even if you are unemployed).  If your clothes don't fit try to get ones that do.    Diet  I will strive to eat foods that are good for me, drink plenty of water, and avoid excessive sugar, caffeine, alcohol, and other mood-altering substances.  Some foods that are helpful in depression are: complex carbohydrates, B vitamins, flaxseed, fish or fish oil, fresh fruits and vegetables.    Psychotherapy  I agree to participate in Individual Therapy (if recommended).    Medication  If prescribed medications, I agree to take them.  Missing doses can result in serious side effects.   I understand that drinking alcohol, or other illicit drug use, may cause potential side effects.  I will not stop my medication abruptly without first discussing it with my provider.    Staying Connected With Others  I will stay in touch with my friends, family members, and my primary care provider/team.    Use your imagination  Be creative.  We all have a creative side; it doesn t matter if it s oil painting, sand castles, or mud pies! This will also kick up the endorphins.    Witness Beauty  (AKA stop and smell the roses) Take a look outside, even in mid-winter. Notice colors, textures. Watch the squirrels and birds.     Service to others  Be of service to others.  There is always someone else in need.  By helping others we can  get out of ourselves  and remember the really important things.  This also provides opportunities for practicing all the other parts of the program.    Humor  Laugh and be silly!  Adjust your TV habits for less news and crime-drama and more comedy.    Control your stress  Try breathing deep, massage therapy, biofeedback, and meditation. Find time to relax each day.     My support system    Clinic Contact:  Phone number:    Contact 1:  Phone number:    Contact 2:  Phone number:    Jain/:  Phone number:    Therapist:  Phone number:    [facility] crisis center:    Phone number:    Other community support:  Phone number:

## 2019-12-05 NOTE — PROGRESS NOTES
Subjective     Roberto Marvin is a 34 year old male who presents to clinic today for the following health issues:    HPI   Depression Followup    How are you doing with your depression since your last visit? Improved     Are you having other symptoms that might be associated with depression? No    Have you had a significant life event?  OTHER: Son born September 3rd     Are you feeling anxious or having panic attacks?   No    Do you have any concerns with your use of alcohol or other drugs? No    Social History     Tobacco Use     Smoking status: Former Smoker     Smokeless tobacco: Never Used   Substance Use Topics     Alcohol use: Not Currently     Drug use: Never     PHQ 8/15/2019 11/7/2019 12/5/2019   PHQ-9 Total Score 11 6 3   Q9: Thoughts of better off dead/self-harm past 2 weeks Not at all Not at all Not at all     DARBY-7 SCORE 8/15/2019 11/7/2019 12/5/2019   Total Score 11 4 9     Last PHQ-9 12/5/2019   1.  Little interest or pleasure in doing things 0   2.  Feeling down, depressed, or hopeless 1   3.  Trouble falling or staying asleep, or sleeping too much 0   4.  Feeling tired or having little energy 1   5.  Poor appetite or overeating 0   6.  Feeling bad about yourself 1   7.  Trouble concentrating 0   8.  Moving slowly or restless 0   Q9: Thoughts of better off dead/self-harm past 2 weeks 0   PHQ-9 Total Score 3   Difficulty at work, home, or with people Somewhat difficult     DARBY-7  12/5/2019   1. Feeling nervous, anxious, or on edge 1   2. Not being able to stop or control worrying 2   3. Worrying too much about different things 1   4. Trouble relaxing 1   5. Being so restless that it is hard to sit still 0   6. Becoming easily annoyed or irritable 2   7. Feeling afraid, as if something awful might happen 2   DARBY-7 Total Score 9   If you checked any problems, how difficult have they made it for you to do your work, take care of things at home, or get along with other people? Somewhat difficult          Suicide Assessment Five-step Evaluation and Treatment (SAFE-T)      How many servings of fruits and vegetables do you eat daily?  4 or more    On average, how many sweetened beverages do you drink each day (Examples: soda, juice, sweet tea, etc.  Do NOT count diet or artificially sweetened beverages)?   0    How many days per week do you miss taking your medication? 0, Not taking    He tried taking the Lexapro for about a month, but it seemed to cause headaches and did not seem to be helping much, so he stopped it.  He has been meeting with Deng, our clinic's therapist, and that has been helpful.  He has met 3 times and will be meeting again in the next week.  The patient seems to be having more issues with anxiety than depression.  He would prefer not to take a medicine daily, but wonders if he could have something on hand to take intermittently if he feels especially anxious.  He and his wife did have a baby on September 3.  She took some time off initially, but now he is taking parental leave to care for the baby.    Patient Active Problem List   Diagnosis     Mild major depression (H)     DARBY (generalized anxiety disorder)     Attention deficit disorder, unspecified hyperactivity presence     History reviewed. No pertinent surgical history.    Social History     Tobacco Use     Smoking status: Former Smoker     Smokeless tobacco: Never Used   Substance Use Topics     Alcohol use: Not Currently     History reviewed. No pertinent family history.      Current Outpatient Medications   Medication Sig Dispense Refill     escitalopram (LEXAPRO) 10 MG tablet Take 1 tablet (10 mg) by mouth daily (Patient not taking: Reported on 12/5/2019) 30 tablet 0     No Known Allergies      Reviewed and updated as needed this visit by Provider         Review of Systems   ROS COMP: Constitutional, HEENT, cardiovascular, pulmonary, gi and gu systems are negative, except as otherwise noted.      Objective    /69 (BP  "Location: Right arm, Patient Position: Sitting, Cuff Size: Adult Regular)   Pulse 54   Temp 98.6  F (37  C) (Oral)   Wt 84.4 kg (186 lb)   SpO2 97%   BMI 25.76 kg/m    Body mass index is 25.76 kg/m .  Physical Exam   GENERAL: healthy, alert and no distress  PSYCH: mentation appears normal, affect normal/bright.  He scored a 3 on his PHQ 9 and a 9 on the DARBY 7 as noted above.    Diagnostic Test Results:  none         Assessment & Plan       ICD-10-CM    1. DARBY (generalized anxiety disorder) F41.1 hydrOXYzine (ATARAX) 25 MG tablet   2. Mild major depression (H) F32.0 hydrOXYzine (ATARAX) 25 MG tablet        BMI:   Estimated body mass index is 25.76 kg/m  as calculated from the following:    Height as of 8/15/19: 1.81 m (5' 11.25\").    Weight as of this encounter: 84.4 kg (186 lb).     He is not really having much difficulty with depression, but he does feel anxious at times  He will continue to meet with Deng for ongoing talk therapy  We discussed options for medication that could be used on an as-needed basis for anxiety, which would include hydroxyzine, buspirone, and/or a benzodiazepine  I would like to stay away from benzodiazepines, so I prescribed hydroxyzine to be used as needed  I advised a recheck with me in about 3 months    Return in about 3 months (around 3/5/2020) for follow up on anxiety depression.    Hai Damon MD  [facility]      "

## 2019-12-06 ASSESSMENT — ANXIETY QUESTIONNAIRES: GAD7 TOTAL SCORE: 9

## 2019-12-12 ENCOUNTER — OFFICE VISIT (OUTPATIENT)
Dept: PSYCHOLOGY | Facility: CLINIC | Age: 34
End: 2019-12-12
Payer: COMMERCIAL

## 2019-12-12 DIAGNOSIS — F41.1 GAD (GENERALIZED ANXIETY DISORDER): Primary | ICD-10-CM

## 2019-12-12 DIAGNOSIS — F90.0 ATTENTION DEFICIT HYPERACTIVITY DISORDER (ADHD), PREDOMINANTLY INATTENTIVE TYPE: ICD-10-CM

## 2019-12-12 DIAGNOSIS — F32.0 MILD MAJOR DEPRESSION (H): ICD-10-CM

## 2019-12-12 PROCEDURE — 90834 PSYTX W PT 45 MINUTES: CPT | Performed by: SOCIAL WORKER

## 2019-12-12 NOTE — PROGRESS NOTES
Progress Note    Patient Name: Roberto Marvin  Date: 12-12-19         Service Type: Individual  Video Visit: No     Session Start Time: 11:00  Session End Time: 11;45     Session Length: 45    Session #: 3    Attendees: Client     Treatment Plan Last Reviewed: Started tx plan today 12-12-19  PHQ-9 / DARBY-7 : Completed on 12-05-19-Improved scores on PHQ9 screening increased GAD7 screening    DATA  Interactive Complexity: No  Crisis: No       Progress Since Last Session (Related to Symptoms / Goals / Homework):   Symptoms: Improving depression symptoms increased anxiety symptoms this session    Homework: Achieved / completed to satisfaction Current session:  Patient worked on mood log and did a great job using CBT skills to lessen his anxiety and improve his mood.  Client enjoying his time at home with his son and mood has improved      Episode of Care Goals: Achieved / completed to satisfaction - ACTION (Actively working towards change); Intervened by reinforcing change plan / affirming steps taken     Current / Ongoing Stressors and Concerns:   Work stress, confidence, social anxiety, attention and focus issues, assertiveness issues     Treatment Objective(s) Addressed in This Session:   Decrease frequency and intensity of feeling down, depressed, hopeless  CBT skills and self esteem     Intervention:   CBT: Thought stopping process and mood log        ASSESSMENT: Current Emotional / Mental Status (status of significant symptoms):   Risk status (Self / Other harm or suicidal ideation)   Patient denies current fears or concerns for personal safety.   Patient denies current or recent suicidal ideation or behaviors.   Patientdenies current or recent homicidal ideation or behaviors.   Patient denies current or recent self injurious behavior or ideation.   Patient denies other safety concerns.   Patient Patient reports there has been no change in risk factors since their last  session.     PatientPatient reports there has been no change in protective factors since their last session.     Recommended that patient call 911 or go to the local ED should there be a change in any of these risk factors.     Appearance:   Appropriate    Eye Contact:   Good    Psychomotor Behavior: Normal    Attitude:   Cooperative    Orientation:   All   Speech    Rate / Production: Normal     Volume:  Normal    Mood:    Anxious  Depressed    Affect:    Worrisome    Thought Content:  Rumination    Thought Form:  Blocking  Coherent  Goal Directed  Logical    Insight:    Good      Medication Review:   No changes to current psychiatric medication(s)     Medication Compliance:   Yes     Changes in Health Issues:   None reported     Chemical Use Review:   Substance Use: Chemical use reviewed, no active concerns identified      Tobacco Use: No current tobacco use.      Diagnosis:              296.31 (F33.0) Major Depressive Disorder, Recurrent Episode, Mild _ and With anxious distress Attention-Deficit/Hyperactivity Disorder  314.00 (F90.0) Predominantly             inattentive presentation              300.02 (F41.1) Generalized Anxiety Disorder   Collateral Reports Completed:   Not Applicable    PLAN: (Patient Tasks / Therapist Tasks / Other)  Client will continue using his mood log when upsetting events occur in his life and utilize skills until they become automatic.  Client will also start using a self esteem schedule to boost his self esteem and bring in answer sheet to next session to discuss low scored areas.  Continue to engage in healthy distraction activities that improve his mood and continue his exercise.         Deng Weiner, SCOTT                                                         ______________________________________________________________________    Treatment Plan    Patient's Name: Roberto Marvin  YOB: 1985    Date: 12-12-19     DSM5 Diagnoses: 296.31 (F33.0) Major Depressive  Disorder, Recurrent Episode, Mild _ and With anxious distress Attention-Deficit/Hyperactivity Disorder  314.00 (F90.0) Predominantly inattentive presentation  300.02 (F41.1) Generalized Anxiety Disorder     Psychosocial & Contextual Factors: work stress, at times lack of confidence, social anxiety, attention and focus issues, assertiveness issues   WHODAS: Completed at first session    Referral / Collaboration:  Referral to another professional/service is not indicated at this time..    Anticipated number of session or this episode of care: 12      MeasurableTreatment Goal(s) related to diagnosis / functional impairment(s)  Goal 1: Patient will decrease his depression and anxiety symptoms and return to normal daily functioning.  Improve attention and focus in his daily work     I will know I've met my goal when I gain more confidence in my self, focus on his strengths and improve my overall mood.      Objective #A (Patient Action)    Patient will use cognitive strategies identified in therapy to challenge anxious thoughts and depressive thoughts.  Status: Continued - Date(s):12-12-19     Intervention(s)  Therapist will assign homework patient will be given a mood log and complete when he has an upsetting event that creates negative feelings and interferes with his mood.  Practice as needed until skills become automatic.    Objective #B  Patient will improve his overall confidence, strengths and self esteem.  Status: Continued - Date(s): 12-12-19    Intervention(s)  Therapist will assign homework Patient will complete self esteem schedule, bring in results and determine areas he needs to improve and work on connecting with his strengths, and daily positive affirmations. .    Objective #C  Patient will lessen his social anxiety and develop healthy boundaries with others.  Status: Continued - Date(s): 12-12-19    Intervention(s)  Therapist will assign homework Improve assertiveness skills, develop trust in his  relationships and set appropriate boundaries and build trust with others.   Patient will work through assertiveness worksheets and start asserting self more with others and feel good about it.       Patient has reviewed and agreed to the above plan.      SCOTT Huff  December 12, 2019

## 2020-01-22 ENCOUNTER — TELEPHONE (OUTPATIENT)
Dept: FAMILY MEDICINE | Facility: CLINIC | Age: 35
End: 2020-01-22

## 2020-01-22 ENCOUNTER — MYC MEDICAL ADVICE (OUTPATIENT)
Dept: FAMILY MEDICINE | Facility: CLINIC | Age: 35
End: 2020-01-22

## 2020-01-22 ASSESSMENT — PATIENT HEALTH QUESTIONNAIRE - PHQ9
SUM OF ALL RESPONSES TO PHQ QUESTIONS 1-9: 3
SUM OF ALL RESPONSES TO PHQ QUESTIONS 1-9: 3
10. IF YOU CHECKED OFF ANY PROBLEMS, HOW DIFFICULT HAVE THESE PROBLEMS MADE IT FOR YOU TO DO YOUR WORK, TAKE CARE OF THINGS AT HOME, OR GET ALONG WITH OTHER PEOPLE: NOT DIFFICULT AT ALL

## 2020-01-22 NOTE — TELEPHONE ENCOUNTER
Panel Management Review  Summary:    Type of outreach:    Patient completed Phq9 in Mychart with a score of 3.    Encounter routed to Care Team.                                                                                                                               Bethanie Alicea CMA

## 2020-01-22 NOTE — TELEPHONE ENCOUNTER
Panel Management Review      Patient has the following on his problem list:     Depression / Dysthymia review    Measure:  Needs PHQ-9 score of 4 or less during index window.  Administer PHQ-9 and if score is 5 or more, send encounter to provider for next steps.    5 - 7 month window range: 12/17/19-4/15/20    PHQ-9 SCORE 8/15/2019 11/7/2019 12/5/2019   PHQ-9 Total Score 11 6 3       If PHQ-9 recheck is 5 or more, route to provider for next steps.    Patient is due for:  PHQ9      Composite cancer screening  Chart review shows that this patient is due/due soon for the following None  Summary:    Patient is due/failing the following:   PHQ9    Action needed:   Patient needs to do PHQ9.    Type of outreach:    Sent [employer] message. Depression screening Phq9    Questions for provider review:    None                                                                                                                                    Bethanie Alicea [facility]        Chart routed to Care Team .

## 2020-01-23 ASSESSMENT — PATIENT HEALTH QUESTIONNAIRE - PHQ9: SUM OF ALL RESPONSES TO PHQ QUESTIONS 1-9: 3

## 2020-04-30 ENCOUNTER — E-VISIT (OUTPATIENT)
Dept: FAMILY MEDICINE | Facility: CLINIC | Age: 35
End: 2020-04-30
Payer: COMMERCIAL

## 2020-04-30 DIAGNOSIS — H60.502 ACUTE OTITIS EXTERNA OF LEFT EAR, UNSPECIFIED TYPE: Primary | ICD-10-CM

## 2020-04-30 PROCEDURE — 99421 OL DIG E/M SVC 5-10 MIN: CPT | Performed by: FAMILY MEDICINE

## 2020-05-01 RX ORDER — NEOMYCIN SULFATE, POLYMYXIN B SULFATE AND HYDROCORTISONE 10; 3.5; 1 MG/ML; MG/ML; [USP'U]/ML
3 SUSPENSION/ DROPS AURICULAR (OTIC) 3 TIMES DAILY
Qty: 1 BOTTLE | Refills: 0 | Status: SHIPPED | OUTPATIENT
Start: 2020-05-01 | End: 2020-06-26

## 2020-05-04 ENCOUNTER — MYC MEDICAL ADVICE (OUTPATIENT)
Dept: FAMILY MEDICINE | Facility: CLINIC | Age: 35
End: 2020-05-04

## 2020-05-04 DIAGNOSIS — H60.502 ACUTE OTITIS EXTERNA OF LEFT EAR, UNSPECIFIED TYPE: Primary | ICD-10-CM

## 2020-05-20 ENCOUNTER — VIRTUAL VISIT (OUTPATIENT)
Dept: FAMILY MEDICINE | Facility: CLINIC | Age: 35
End: 2020-05-20
Payer: COMMERCIAL

## 2020-05-20 DIAGNOSIS — H92.12 OTORRHEA OF LEFT EAR: Primary | ICD-10-CM

## 2020-05-20 PROCEDURE — 99213 OFFICE O/P EST LOW 20 MIN: CPT | Mod: TEL | Performed by: FAMILY MEDICINE

## 2020-05-20 NOTE — PROGRESS NOTES
"Roberto Marvin is a 35 year old male who is being evaluated via a billable telephone visit.      The patient has been notified of following:     \"This telephone visit will be conducted via a call between you and your physician/provider. We have found that certain health care needs can be provided without the need for a physical exam.  This service lets us provide the care you need with a short phone conversation.  If a prescription is necessary we can send it directly to your pharmacy.  If lab work is needed we can place an order for that and you can then stop by our lab to have the test done at a later time.    Telephone visits are billed at different rates depending on your insurance coverage. During this emergency period, for some insurers they may be billed the same as an in-person visit.  Please reach out to your insurance provider with any questions.    If during the course of the call the physician/provider feels a telephone visit is not appropriate, you will not be charged for this service.\"    Patient has given verbal consent for Telephone visit?  Yes    What phone number would you like to be contacted at? [phone]    How would you like to obtain your AVS? MyChart    Subjective     Roberto Marvin is a 35 year old male who presents via phone visit today for the following health issues:    HPI  Follow up to E-visit on 4/30/2020. Right ear. Finished the antibiotic and still using the ear drops. No pain and at times it feels plugged. This morning wasn't as bad. Has tried flushing his ear when it first started.      His ear is no longer hurting.  He finished the Augmentin prescription.  He has still been using some eardrops.    Patient Active Problem List   Diagnosis     Mild major depression (H)     DARBY (generalized anxiety disorder)     Attention deficit disorder, unspecified hyperactivity presence     History reviewed. No pertinent surgical history.    Social History     Tobacco Use     Smoking " "status: Former Smoker     Smokeless tobacco: Never Used   Substance Use Topics     Alcohol use: Not Currently     History reviewed. No pertinent family history.      Current Outpatient Medications   Medication Sig Dispense Refill     neomycin-polymyxin-hydrocortisone (CORTISPORIN) 3.5-34572-9 otic suspension Place 3 drops Into the left ear 3 times daily until it is pain-free 1 Bottle 0     hydrOXYzine (ATARAX) 25 MG tablet Take 1 tablet (25 mg) by mouth 3 times daily as needed for anxiety 30 tablet 2     No Known Allergies    Reviewed and updated as needed this visit by Provider         Review of Systems   Constitutional, HEENT, cardiovascular, pulmonary, gi and gu systems are negative, except as otherwise noted.       Objective   Reported vitals:  There were no vitals taken for this visit.     This visit is being conducted \"virtually\" via telephone rather than \"in person\" because of the current nationwide COVID-19 pandemic and the desire to limit potential exposures to illness for patients.    PSYCH: Alert and oriented times 3; coherent speech, normal   rate and volume, able to articulate logical thoughts, able   to abstract reason, no tangential thoughts, no hallucinations   or delusions  His affect is normal  RESP: No cough, no audible wheezing, able to talk in full sentences  Remainder of exam unable to be completed due to telephone visits    Diagnostic Test Results:  none         Assessment/Plan:  1. Otorrhea of left ear    He has still been having a little bit of drainage from the ear, which I suspect is primarily to the use of ongoing eardrops, as opposed to any ongoing infection or perforated TM  I recommended stopping the eardrops now and not putting anything in the ear to scratch at  He could try to do a light irrigation of the canal with a bulb syringe and warm water  If the ear symptoms persist over the next week or 2, then I recommended an office visit for further evaluation    Return for a recheck if " symptoms worsen or fail to improve.      Phone call duration:  7 minutes    Hai Damon MD

## 2020-06-26 ENCOUNTER — OFFICE VISIT (OUTPATIENT)
Dept: FAMILY MEDICINE | Facility: CLINIC | Age: 35
End: 2020-06-26
Payer: COMMERCIAL

## 2020-06-26 VITALS
SYSTOLIC BLOOD PRESSURE: 116 MMHG | HEART RATE: 57 BPM | BODY MASS INDEX: 26.31 KG/M2 | DIASTOLIC BLOOD PRESSURE: 67 MMHG | WEIGHT: 190 LBS | TEMPERATURE: 98.4 F | OXYGEN SATURATION: 97 %

## 2020-06-26 DIAGNOSIS — H61.21 EXCESSIVE CERUMEN IN RIGHT EAR CANAL: Primary | ICD-10-CM

## 2020-06-26 PROCEDURE — 99213 OFFICE O/P EST LOW 20 MIN: CPT | Performed by: FAMILY MEDICINE

## 2020-06-26 ASSESSMENT — PAIN SCALES - GENERAL: PAINLEVEL: NO PAIN (0)

## 2020-06-26 ASSESSMENT — PATIENT HEALTH QUESTIONNAIRE - PHQ9: SUM OF ALL RESPONSES TO PHQ QUESTIONS 1-9: 0

## 2020-06-26 NOTE — PROGRESS NOTES
Subjective     Roberto Marvin is a 35 year old male who presents to clinic today for the following health issues:    HPI   Ear pain      Duration: End of April    Description (location/character/radiation): Intermittent pain, feels clogged but having deep pain that is also in his neck    Intensity:  0/10    Accompanying signs and symptoms: None    History (similar episodes/previous evaluation): Had this before    Precipitating or alleviating factors: None    Therapies tried and outcome: Ear drops- no relief. Antibiotic helped a little bit.     Dayana Rust MA    He was having some intermittent right ear discomfort and fullness and drainage in the last couple of months and had some virtual visits for that.  He received antibiotic treatment orally and topically.  He is no longer having any ear pain.  He still feels a little bit of fullness and a clogged ear sensation at times.  Hearing seems okay at the moment.  No ear drainage.    Patient Active Problem List   Diagnosis     Mild major depression (H)     DARBY (generalized anxiety disorder)     Attention deficit disorder, unspecified hyperactivity presence     History reviewed. No pertinent surgical history.    Social History     Tobacco Use     Smoking status: Former Smoker     Smokeless tobacco: Never Used   Substance Use Topics     Alcohol use: Not Currently     History reviewed. No pertinent family history.      Current Outpatient Medications   Medication Sig Dispense Refill     hydrOXYzine (ATARAX) 25 MG tablet Take 1 tablet (25 mg) by mouth 3 times daily as needed for anxiety 30 tablet 2     neomycin-polymyxin-hydrocortisone (CORTISPORIN) 3.5-52279-1 otic suspension Place 3 drops Into the left ear 3 times daily until it is pain-free (Patient not taking: Reported on 6/26/2020) 1 Bottle 0     No Known Allergies    Reviewed and updated as needed this visit by Provider         Review of Systems   Constitutional, HEENT, cardiovascular, pulmonary, gi and gu  "systems are negative, except as otherwise noted.      Objective    /67 (BP Location: Left arm, Patient Position: Chair, Cuff Size: Adult Regular)   Pulse 57   Temp 98.4  F (36.9  C) (Oral)   Wt 86.2 kg (190 lb)   SpO2 97%   BMI 26.31 kg/m    Body mass index is 26.31 kg/m .  Physical Exam   GENERAL: healthy, alert and no distress  HENT: Both external ears appear normal and he has no pain when pulling on the ears or pressing on the tragus.  His left ear canal is clear and the TM appears normal.  The right ear canal has a large amount of cerumen present and the TM is only partially seen.  PSYCH: Affect is pleasant and appropriate.  He scored a 0 on his PHQ 9.    We then irrigated out his right ear canal until clear.  The TM was then seen and appears normal.  No perforation or apparent sign of infection.    Diagnostic Test Results:  none         Assessment & Plan       ICD-10-CM    1. Excessive cerumen in right ear canal  H61.21         BMI:   Estimated body mass index is 26.31 kg/m  as calculated from the following:    Height as of 8/15/19: 1.81 m (5' 11.25\").    Weight as of this encounter: 86.2 kg (190 lb).     His right ear was irrigated out until clear  He has no further signs of infection at this time  He was advised on home remedies to address earwax buildup    Return for a follow up as needed/desired.    Hai Damon MD  [facility]    "

## 2021-01-03 ENCOUNTER — HEALTH MAINTENANCE LETTER (OUTPATIENT)
Age: 36
End: 2021-01-03

## 2021-03-30 ASSESSMENT — ENCOUNTER SYMPTOMS
HEADACHES: 0
DIZZINESS: 0
HEARTBURN: 0
DYSURIA: 0
PALPITATIONS: 0
FEVER: 0
FREQUENCY: 0
CHILLS: 0
WEAKNESS: 0
HEMATOCHEZIA: 0
JOINT SWELLING: 0
ARTHRALGIAS: 1
COUGH: 0
PARESTHESIAS: 0
ABDOMINAL PAIN: 0
EYE PAIN: 0
NAUSEA: 0
MYALGIAS: 0
HEMATURIA: 0
SHORTNESS OF BREATH: 0
SORE THROAT: 0
NERVOUS/ANXIOUS: 0
CONSTIPATION: 0
DIARRHEA: 0

## 2021-03-31 DIAGNOSIS — Z31.448 ENCOUNTER FOR OTHER GENETIC TESTING OF MALE FOR PROCREATIVE MANAGEMENT: Primary | ICD-10-CM

## 2021-03-31 DIAGNOSIS — Z31.448 ENCOUNTER FOR OTHER GENETIC TESTING OF MALE FOR PROCREATIVE MANAGEMENT: ICD-10-CM

## 2021-03-31 PROCEDURE — 36415 COLL VENOUS BLD VENIPUNCTURE: CPT | Performed by: OBSTETRICS & GYNECOLOGY

## 2021-03-31 PROCEDURE — 999N001127 HC STATISTIC COUNSYL FAMILY PREP: Performed by: OBSTETRICS & GYNECOLOGY

## 2021-03-31 NOTE — PROGRESS NOTES
[facility]  Genetic Counseling Consult    Patient:  Roberto ROBERTSON Czadzechandu YOB: 1985   Date of Service:  03/31/21      Roberto was seen at the [facility] [place] for genetic consultation to discuss the option of carrier screening.         Impression/Plan:   1. Roberto elected to pursue expanded carrier screening as part of his partner, Linda's ongoing pregnancy management. A sample was drawn today and sent to [facility]. Results are expected within 2-3 weeks. We will contact him to discuss the results. Roberto provided verbal permission for results to be left in his voicemail at. Authorization to share protected health information was signed today for us to discuss results with his partner.      Expanded carrier screening for mutations in a large panel of genes associated with autosomal recessive conditions including cystic fibrosis, thalassemias, hearing loss, spinal muscular atrophy, and others, is now available. We also reviewed that expanded carrier screening can assess for common X-linked recessive disorders such as Fragile X syndrome.       We discussed that expanded carrier screening is designed to identify carrier status for conditions that are primarily childhood or adolescent onset. Expanded carrier screening does not evaluate for adult-onset conditions such as hereditary cancer syndromes, dementia/ Alzheimer's disease, or cardiovascular disease risk factors. Additionally, expanded carrier screening is not comprehensive for all known genetic diseases or inherited conditions. This is a screening test, and residual carrier status risk figures will be provided to the patient after results become available. Carrier screening is not meant to diagnose the patient with a condition, and generally carriers are asymptomatic. However, certain genes may confer increased risks for various health concerns in carriers (FALGUNI, FMR1).     It was  a pleasure to be involved with Roberto care. Face-to-face time of the meeting was 45 minutes.    Sara Roberson MS, [facility]  Licensed Genetic Counselor  [facility]  Maternal Fetal Medicine  [email or web address]  [phone]

## 2021-04-05 ENCOUNTER — OFFICE VISIT (OUTPATIENT)
Dept: FAMILY MEDICINE | Facility: CLINIC | Age: 36
End: 2021-04-05
Payer: COMMERCIAL

## 2021-04-05 VITALS
HEART RATE: 65 BPM | TEMPERATURE: 98.3 F | WEIGHT: 189 LBS | SYSTOLIC BLOOD PRESSURE: 115 MMHG | DIASTOLIC BLOOD PRESSURE: 76 MMHG | HEIGHT: 71 IN | OXYGEN SATURATION: 99 % | BODY MASS INDEX: 26.46 KG/M2

## 2021-04-05 DIAGNOSIS — M21.41 PES PLANUS OF BOTH FEET: ICD-10-CM

## 2021-04-05 DIAGNOSIS — M21.42 PES PLANUS OF BOTH FEET: ICD-10-CM

## 2021-04-05 DIAGNOSIS — Z00.00 ROUTINE GENERAL MEDICAL EXAMINATION AT A HEALTH CARE FACILITY: Primary | ICD-10-CM

## 2021-04-05 DIAGNOSIS — M25.562 LEFT KNEE PAIN, UNSPECIFIED CHRONICITY: ICD-10-CM

## 2021-04-05 PROBLEM — F32.0 MILD MAJOR DEPRESSION (H): Status: RESOLVED | Noted: 2019-08-15 | Resolved: 2021-04-05

## 2021-04-05 PROBLEM — F98.8 ATTENTION DEFICIT DISORDER, UNSPECIFIED HYPERACTIVITY PRESENCE: Status: RESOLVED | Noted: 2019-08-15 | Resolved: 2021-04-05

## 2021-04-05 PROBLEM — F41.1 GAD (GENERALIZED ANXIETY DISORDER): Status: RESOLVED | Noted: 2019-08-15 | Resolved: 2021-04-05

## 2021-04-05 PROCEDURE — 99395 PREV VISIT EST AGE 18-39: CPT | Performed by: FAMILY MEDICINE

## 2021-04-05 ASSESSMENT — ENCOUNTER SYMPTOMS
EYE PAIN: 0
CONSTIPATION: 0
DIARRHEA: 0
HEMATURIA: 0
ARTHRALGIAS: 1
JOINT SWELLING: 0
DYSURIA: 0
HEMATOCHEZIA: 0
ABDOMINAL PAIN: 0
WEAKNESS: 0
SORE THROAT: 0
FEVER: 0
SHORTNESS OF BREATH: 0
NAUSEA: 0
MYALGIAS: 0
HEADACHES: 0
NERVOUS/ANXIOUS: 0
CHILLS: 0
PARESTHESIAS: 0
PALPITATIONS: 0
HEARTBURN: 0
COUGH: 0
DIZZINESS: 0
FREQUENCY: 0

## 2021-04-05 ASSESSMENT — PATIENT HEALTH QUESTIONNAIRE - PHQ9: SUM OF ALL RESPONSES TO PHQ QUESTIONS 1-9: 1

## 2021-04-05 ASSESSMENT — MIFFLIN-ST. JEOR: SCORE: 1825.43

## 2021-04-05 NOTE — PROGRESS NOTES
SUBJECTIVE:   CC: Roberto Marvin is an 36 year old male who presents for a preventative health visit.       Patient has been advised of split billing requirements and indicates understanding: Yes  Healthy Habits:     Getting at least 3 servings of Calcium per day:  Yes    Bi-annual eye exam:  Yes    Dental care twice a year:  Yes    Sleep apnea or symptoms of sleep apnea:  None    Diet:  Regular (no restrictions)    Frequency of exercise:  6-7 days/week    Duration of exercise:  30-45 minutes    Taking medications regularly:  Yes    Medication side effects:  None    PHQ-2 Total Score: 0    Additional concerns today:  Yes    Other concerns:     Left knee.    He has been noticing some left knee achiness now for quite a while, mainly after running.  The discomfort is mainly medially.  He does have flat feet bilaterally and wears over-the-counter shoe inserts.  He is interested in a referral to address this further.  He is not having locking or catching or giving way of the knee.    He is on no routine medications now.  He and his wife have a 2-year-old son and they just learned they are expecting a daughter with their current pregnancy.  He is excited about that.    Today's PHQ-2 Score:   PHQ-2 ( 1999 [employer]) 3/30/2021   Q1: Little interest or pleasure in doing things 0   Q2: Feeling down, depressed or hopeless 0   PHQ-2 Score 0   Q1: Little interest or pleasure in doing things Not at all   Q2: Feeling down, depressed or hopeless Not at all   PHQ-2 Score 0       Abuse: Current or Past(Physical, Sexual or Emotional)- No  Do you feel safe in your environment? Yes    Have you ever done Advance Care Planning? (For example, a Health Directive, POLST, or a discussion with a medical provider or your loved ones about your wishes): No, advance care planning information given to patient to review.  Advanced care planning was discussed at today's visit.    Social History     Tobacco Use     Smoking status: Former Smoker      Smokeless tobacco: Never Used   Substance Use Topics     Alcohol use: Not Currently         Alcohol Use 3/30/2021   Prescreen: >3 drinks/day or >7 drinks/week? No       Last PSA: No results found for: PSA    Reviewed orders with patient. Reviewed health maintenance and updated orders accordingly - Yes  Patient Active Problem List   Diagnosis   (none) - all problems resolved or deleted     History reviewed. No pertinent surgical history.    Social History     Tobacco Use     Smoking status: Former Smoker     Smokeless tobacco: Never Used   Substance Use Topics     Alcohol use: Not Currently     History reviewed. No pertinent family history.      No current outpatient medications on file.     No Known Allergies    Reviewed and updated as needed this visit by clinical staff  Tobacco  Allergies  Meds   Med Hx  Surg Hx  Fam Hx  Soc Hx        Reviewed and updated as needed this visit by Provider                    Review of Systems   Constitutional: Negative for chills and fever.   HENT: Negative for congestion, ear pain, hearing loss and sore throat.    Eyes: Negative for pain and visual disturbance.   Respiratory: Negative for cough and shortness of breath.    Cardiovascular: Negative for chest pain, palpitations and peripheral edema.   Gastrointestinal: Negative for abdominal pain, constipation, diarrhea, heartburn, hematochezia and nausea.   Genitourinary: Negative for discharge, dysuria, frequency, genital sores, hematuria, impotence and urgency.   Musculoskeletal: Positive for arthralgias. Negative for joint swelling and myalgias.   Skin: Negative for rash.   Neurological: Negative for dizziness, weakness, headaches and paresthesias.   Psychiatric/Behavioral: Negative for mood changes. The patient is not nervous/anxious.      CONSTITUTIONAL: NEGATIVE for fever, chills, change in weight  INTEGUMENTARY/SKIN: NEGATIVE for worrisome rashes, moles or lesions  EYES: NEGATIVE for vision changes or irritation  ENT:  "NEGATIVE for ear, mouth and throat problems  RESP: NEGATIVE for significant cough or SOB  CV: NEGATIVE for chest pain, palpitations or peripheral edema  GI: NEGATIVE for nausea, abdominal pain, heartburn, or change in bowel habits   male: negative for dysuria, hematuria, decreased urinary stream, erectile dysfunction, urethral discharge  MUSCULOSKELETAL: See above  NEURO: NEGATIVE for weakness, dizziness or paresthesias  PSYCHIATRIC: NEGATIVE for changes in mood or affect    OBJECTIVE:   /76 (BP Location: Left arm, Patient Position: Sitting, Cuff Size: Adult Large)   Pulse 65   Temp 98.3  F (36.8  C) (Oral)   Ht 1.813 m (5' 11.38\")   Wt 85.7 kg (189 lb)   SpO2 99%   BMI 26.08 kg/m      Physical Exam  GENERAL: healthy, alert and no distress  EYES: Eyes grossly normal to inspection, PERRL and conjunctivae and sclerae normal  HENT: ear canals and TM's normal, nose and mouth without ulcers or lesions  NECK: no adenopathy, no asymmetry, masses, or scars and thyroid normal to palpation  RESP: lungs clear to auscultation - no rales, rhonchi or wheezes  CV: regular rate and rhythm, normal S1 S2, no S3 or S4, no murmur, click or rub, no peripheral edema and peripheral pulses strong  ABDOMEN: soft, nontender, no hepatosplenomegaly, no masses  MS: He does have flat feet bilaterally.  No apparent left knee effusion.  Ligament and cartilage testing are grossly within normal limits with the left knee.  SKIN: no suspicious lesions or rashes.  He has extensive tattoos on his arms.  NEURO: Normal strength and tone, mentation intact and speech normal  PSYCH: mentation appears normal, affect normal/bright.  He scored a 1 on his PHQ-9 today.    Diagnostic Test Results:  Labs reviewed in Epic    ASSESSMENT/PLAN:       ICD-10-CM    1. Routine general medical examination at a health care facility  Z00.00    2. Left knee pain, unspecified chronicity  M25.562 Orthopedic & Spine  Referral   3. Pes planus of both feet  " "M21.41     M21.42      Blood pressure and other vital signs look good  Discussed various options for further evaluation and treatment of his left knee discomfort and we elected to refer him to one of our sports medicine providers for that  He is not fasting today and had normal labs with his last physical, so we will pass on lab work for today  Plan recheck in 1 to 2 years, or sooner prn    Patient has been advised of split billing requirements and indicates understanding: Yes  COUNSELING:   Reviewed preventive health counseling, as reflected in patient instructions       Regular exercise       Healthy diet/nutrition    Estimated body mass index is 26.08 kg/m  as calculated from the following:    Height as of this encounter: 1.813 m (5' 11.38\").    Weight as of this encounter: 85.7 kg (189 lb).     Weight management plan: Discussed healthy diet and exercise guidelines    He reports that he has quit smoking. He has never used smokeless tobacco.      Counseling Resources:  ATP IV Guidelines  Pooled Cohorts Equation Calculator  FRAX Risk Assessment  ICSI Preventive Guidelines  Dietary Guidelines for Americans, 2010  USDA's MyPlate  ASA Prophylaxis  Lung CA Screening    Hai Damon MD  [facility]  "

## 2021-04-13 LAB — COUNSYL FORESIGHT CARRIER SCREEN: NORMAL

## 2021-04-15 ENCOUNTER — OFFICE VISIT (OUTPATIENT)
Dept: ORTHOPEDICS | Facility: CLINIC | Age: 36
End: 2021-04-15
Attending: FAMILY MEDICINE
Payer: COMMERCIAL

## 2021-04-15 ENCOUNTER — ANCILLARY PROCEDURE (OUTPATIENT)
Dept: GENERAL RADIOLOGY | Facility: CLINIC | Age: 36
End: 2021-04-15
Attending: PEDIATRICS
Payer: COMMERCIAL

## 2021-04-15 VITALS
HEIGHT: 71 IN | SYSTOLIC BLOOD PRESSURE: 116 MMHG | DIASTOLIC BLOOD PRESSURE: 62 MMHG | BODY MASS INDEX: 26.46 KG/M2 | WEIGHT: 189 LBS

## 2021-04-15 DIAGNOSIS — M21.42 PES PLANUS OF BOTH FEET: ICD-10-CM

## 2021-04-15 DIAGNOSIS — M25.562 LEFT KNEE PAIN, UNSPECIFIED CHRONICITY: Primary | ICD-10-CM

## 2021-04-15 DIAGNOSIS — M25.562 LEFT KNEE PAIN: ICD-10-CM

## 2021-04-15 DIAGNOSIS — M25.562 PAIN OF LEFT PATELLOFEMORAL JOINT: ICD-10-CM

## 2021-04-15 DIAGNOSIS — M21.41 PES PLANUS OF BOTH FEET: ICD-10-CM

## 2021-04-15 PROCEDURE — 73562 X-RAY EXAM OF KNEE 3: CPT | Performed by: RADIOLOGY

## 2021-04-15 PROCEDURE — 99244 OFF/OP CNSLTJ NEW/EST MOD 40: CPT | Performed by: PEDIATRICS

## 2021-04-15 ASSESSMENT — MIFFLIN-ST. JEOR: SCORE: 1825.46

## 2021-04-15 NOTE — LETTER
4/15/2021         RE: Roberto Marvin  [address] [facility] [address] [address]        Dear Colleague,    Thank you for referring your patient, Roberto Marvin, to the [facility]. Please see a copy of my visit note below.    ASSESSMENT & PLAN    Roberto was seen today for pain.    Diagnoses and all orders for this visit:    Left knee pain, unspecified chronicity  -     Orthopedic & Spine  Referral  -     XR Knee Standing AP Bilat [place] Bilat Lat Left; Future    Pain of left patellofemoral joint  -     [facility] PT AND HAND REFERRAL; Future    Pes planus of both feet      This issue is chronic and persistent.  C/w functional issue at knee, PFS. Frequently multiple potential components to this, does have pes planus.  We discussed the following: symptom treatment, activity modification/rest, imaging, rehab, potential for improvement with time and support for the affected area. Following discussion, plan:  PT next. Referral placed.  Discussed support options, may use knee compression/sleeve if desired.  Also discussed consideration of arch support, though I would favor rehab for the knee first, rather than making footwear adjustments to start.  We discussed consideration of additional imaging of the affected area with MRI, but this is not required currently given assessment and plan for other intervention.  F/u 6 weeks if not improving with PT.  Questions answered. Discussed signs and symptoms that may indicate more serious issues; the patient was instructed to seek appropriate care if noted. Roberto Marvin indicates understanding of these issues and agrees with the plan.        See Patient Instructions  Patient Instructions   Left knee pain consistent with patellofemoral pain  Discussed limiting activities for soreness  Physical therapy next; referral placed  Monitor course 4-6 weeks with PT    If you have any further questions for your physician or physician s care team  "you can call [phone] and use option 3 to leave a voice message. Calls received during business hours will be returned same day.        Warren Ames Westwood Lodge HospitalDO mat  [facility]      CC: Hai Damon      -----  Chief Complaint   Patient presents with     Left Knee - Pain       SUBJECTIVE  Roberto Marvin is a/an 36 year old male who is seen in consultation at the request of  Hai Damon M.D. for evaluation of left knee pain.     The patient is seen by themselves.  The patient is Right handed    Onset: 1 years(s) ago. Reports insidious onset without acute precipitating event. Saw PCP 4/5/21 who referred to Sports Medicine.   Location of Pain: left medial knee    Worsened by: walking and running over a 1 mile  Better with: ice   Treatments tried: ice and ibuprofen  Associated symptoms: no distal numbness or tingling; denies swelling or warmth    Orthopedic/Surgical history: NO  Social History/Occupation: , likes to run 4-6 miles 3-4x/week     No family history pertinent to patient's problem today.     **  Started running 1-2 years ago. Increased pain with time, with running.  Past 6-12 months noted pain with running 4-6 miles, and then the next day noted increased pain. Eventually would improve, to point he could run again, but then cycle resumed.  Also some low back pain, chronic.  In knee is more peripatellar, medially.    Pes planus.      REVIEW OF SYSTEMS:  Review of Systems   All other systems reviewed and are negative.        OBJECTIVE:  /62   Ht 1.813 m (5' 11.38\")   Wt 85.7 kg (189 lb)   BMI 26.08 kg/m     General: healthy, alert and in no distress  HEENT: no scleral icterus or conjunctival erythema  Skin: no suspicious lesions or rash. No jaundice.  CV: distal perfusion intact   Resp: normal respiratory effort without conversational dyspnea   Psych: normal mood and affect  Gait: normal steady gait with appropriate coordination and " balance   Neuro: Normal light sensory exam of lower extremity     Left Knee exam    Inspection:   no effusion   no ecchymosis    ROM:      Full active and passive ROM with flexion and extension    Patellar Motion:      Normal patellar tracking noted through range of motion    Tender:      medial joint line mild    Non Tender:       remainder of knee area    Special Tests:      neg (-) Lucía       neg (-) Lachman       neg (-) anterior drawer       neg (-) posterior drawer       neg (-) varus       neg (-) valgus       no pain with forced extension    Evaluation of ipsilateral kinetic chain:        pes planus noted bilaterally       Anterior knee excursion with mini squat      RADIOLOGY:  I independently ordered, visualized and reviewed these images with the patient  No acute bony abnormality.    XR Knee Standing AP Bilat [place] Bilat Lat Left    Narrative    KNEE STANDING AP BILATERAL, SUNRISE BILATERAL, LATERAL LEFT  4/15/2021  11:28 AM     HISTORY:  Left knee pain.    COMPARISON: None.      Impression    IMPRESSION: Normal bones, joint spaces and alignment. No evidence of  fracture, effusion or calcified intra-articular body.    ENRIKE LOPEZ MD             Review of prior external note(s) from - referring  16 minutes spent on the date of the encounter doing chart review, history and exam, documentation and further activities per the note             Again, thank you for allowing me to participate in the care of your patient.        Sincerely,        Warren Valentin DO

## 2021-04-15 NOTE — PATIENT INSTRUCTIONS
Left knee pain consistent with patellofemoral pain  Discussed limiting activities for soreness  Physical therapy next; referral placed  Monitor course 4-6 weeks with PT    If you have any further questions for your physician or physician s care team you can call [phone] and use option 3 to leave a voice message. Calls received during business hours will be returned same day.

## 2021-04-15 NOTE — PROGRESS NOTES
ASSESSMENT & PLAN    Roberto was seen today for pain.    Diagnoses and all orders for this visit:    Left knee pain, unspecified chronicity  -     Orthopedic & Spine  Referral  -     XR Knee Standing AP Bilat [place] Bilat Lat Left; Future    Pain of left patellofemoral joint  -     [facility] PT AND HAND REFERRAL; Future    Pes planus of both feet      This issue is chronic and persistent.  C/w functional issue at knee, PFS. Frequently multiple potential components to this, does have pes planus.  We discussed the following: symptom treatment, activity modification/rest, imaging, rehab, potential for improvement with time and support for the affected area. Following discussion, plan:  PT next. Referral placed.  Discussed support options, may use knee compression/sleeve if desired.  Also discussed consideration of arch support, though I would favor rehab for the knee first, rather than making footwear adjustments to start.  We discussed consideration of additional imaging of the affected area with MRI, but this is not required currently given assessment and plan for other intervention.  F/u 6 weeks if not improving with PT.  Questions answered. Discussed signs and symptoms that may indicate more serious issues; the patient was instructed to seek appropriate care if noted. Roberto Marvin indicates understanding of these issues and agrees with the plan.        See Patient Instructions  Patient Instructions   Left knee pain consistent with patellofemoral pain  Discussed limiting activities for soreness  Physical therapy next; referral placed  Monitor course 4-6 weeks with PT    If you have any further questions for your physician or physician s care team you can call [phone] and use option 3 to leave a voice message. Calls received during business hours will be returned same day.        Warren Valentin DO  [facility]      CC: Hai Damon      -----  Chief Complaint  "  Patient presents with     Left Knee - Pain       SUBJECTIVE  Roberto Marvin is a/an 36 year old male who is seen in consultation at the request of  Hai Damon M.D. for evaluation of left knee pain.     The patient is seen by themselves.  The patient is Right handed    Onset: 1 years(s) ago. Reports insidious onset without acute precipitating event. Saw PCP 4/5/21 who referred to Sports Medicine.   Location of Pain: left medial knee    Worsened by: walking and running over a 1 mile  Better with: ice   Treatments tried: ice and ibuprofen  Associated symptoms: no distal numbness or tingling; denies swelling or warmth    Orthopedic/Surgical history: NO  Social History/Occupation: , likes to run 4-6 miles 3-4x/week     No family history pertinent to patient's problem today.     **  Started running 1-2 years ago. Increased pain with time, with running.  Past 6-12 months noted pain with running 4-6 miles, and then the next day noted increased pain. Eventually would improve, to point he could run again, but then cycle resumed.  Also some low back pain, chronic.  In knee is more peripatellar, medially.    Pes planus.      REVIEW OF SYSTEMS:  Review of Systems   All other systems reviewed and are negative.        OBJECTIVE:  /62   Ht 1.813 m (5' 11.38\")   Wt 85.7 kg (189 lb)   BMI 26.08 kg/m     General: healthy, alert and in no distress  HEENT: no scleral icterus or conjunctival erythema  Skin: no suspicious lesions or rash. No jaundice.  CV: distal perfusion intact   Resp: normal respiratory effort without conversational dyspnea   Psych: normal mood and affect  Gait: normal steady gait with appropriate coordination and balance   Neuro: Normal light sensory exam of lower extremity     Left Knee exam    Inspection:   no effusion   no ecchymosis    ROM:      Full active and passive ROM with flexion and extension    Patellar Motion:      Normal patellar tracking noted through range of " motion    Tender:      medial joint line mild    Non Tender:       remainder of knee area    Special Tests:      neg (-) Lucía       neg (-) Lachman       neg (-) anterior drawer       neg (-) posterior drawer       neg (-) varus       neg (-) valgus       no pain with forced extension    Evaluation of ipsilateral kinetic chain:        pes planus noted bilaterally       Anterior knee excursion with mini squat      RADIOLOGY:  I independently ordered, visualized and reviewed these images with the patient  No acute bony abnormality.    XR Knee Standing AP Bilat [place] Bilat Lat Left    Narrative    KNEE STANDING AP BILATERAL, SUNRISE BILATERAL, LATERAL LEFT  4/15/2021  11:28 AM     HISTORY:  Left knee pain.    COMPARISON: None.      Impression    IMPRESSION: Normal bones, joint spaces and alignment. No evidence of  fracture, effusion or calcified intra-articular body.    ENRIKE LOPEZ MD             Review of prior external note(s) from - referring  16 minutes spent on the date of the encounter doing chart review, history and exam, documentation and further activities per the note

## 2021-04-22 ENCOUNTER — THERAPY VISIT (OUTPATIENT)
Dept: PHYSICAL THERAPY | Facility: CLINIC | Age: 36
End: 2021-04-22
Attending: PEDIATRICS
Payer: COMMERCIAL

## 2021-04-22 DIAGNOSIS — M25.562 PAIN OF LEFT PATELLOFEMORAL JOINT: ICD-10-CM

## 2021-04-22 DIAGNOSIS — M22.2X9 PATELLOFEMORAL PAIN SYNDROME: Primary | ICD-10-CM

## 2021-04-22 PROCEDURE — 97161 PT EVAL LOW COMPLEX 20 MIN: CPT | Mod: GP | Performed by: PHYSICAL THERAPIST

## 2021-04-22 PROCEDURE — 97530 THERAPEUTIC ACTIVITIES: CPT | Mod: GP | Performed by: PHYSICAL THERAPIST

## 2021-04-22 PROCEDURE — 97110 THERAPEUTIC EXERCISES: CPT | Mod: GP | Performed by: PHYSICAL THERAPIST

## 2021-04-22 NOTE — PROGRESS NOTES
Physical Therapy Initial Evaluation  Subjective:    Patient Health History  Roberto Marvin being seen for Pain in left knee.     Problem began: 5/15/2019.   Problem occurred: Over time   Pain is reported as 1/10 on pain scale.  General health as reported by patient is excellent.  Pertinent medical history includes: asthma.        Surgeries include:  Other. Other surgery history details: Laparoscopic Appendix Removal.    Current medications:  None.    Current occupation is [employer].   Primary job tasks include:  Computer work.                  Therapist Generated HPI Evaluation  Problem details: Patient reports the onset of left knee pain one year ago after increasing his running distance. He reports the left anterior knee pain occurs mostly after running and after long days at work standing..         Type of problem:  Left knee.    This is a chronic condition.  Condition occurred with:  Running.  Where condition occurred: in the community.  Patient reports pain:  Anterior and medial.  Pain is described as aching and is intermittent.  Pain radiates to:  Knee. Pain is worse during the day.  Since onset symptoms are gradually worsening.  Associated with: none. Exacerbated by: running, walking long distances.    Special tests included:  X-ray (see eval).    Restrictions due to condition include:  Working in normal job without restrictions ().  Barriers include:  None as reported by patient.                        Objective:  Standing Alignment:                Ankle/Foot:  Pes planus L and pes planus R    Gait:    Gait Type:  Normal                                                      Hip Evaluation  HIP AROM:  AROM:    Left Hip:     Normal    Right Hip:   Normal                    Hip Strength:    Flexion:   Left: 4+/5   Pain:  Right: 4+/5   Pain:                    Extension:  Left: 4/5  Pain:Right: 4/5    Pain:    Abduction:  Left: 4-/5     Pain:Right: 4-/5     Pain:  Adduction:  Left: 4+/5    Pain:Right: 4+/5   Pain:  Internal Rotation:  Left: 4+/5    Pain:Right: 4+/5   Pain:  External Rotation:  Left: 4-/5   Pain:  Right: 4-/5   Pain:                     Knee Evaluation:  ROM:  AROM: normal  Strength wnl knee: grossly 4+/5 bilaterally.          Strength:         Quad Set Left: Good    Pain:   Quad Set Right: Good    Pain:  Ligament Testing:  Normal                Special Tests:     Left knee negative for the following special tests:  Meninscal (mild pain with thesslys, negative mccmurrays and applys)    Palpation:  Normal      Edema:  Normal    Mobility Testing:  Normal            Functional Testing:          Quad:    Single Leg Squat:  Left:       Moderate loss of control, femoral IR and excessive anterior knee excursion  Right:       Moderate loss of control, femoral IR and excessive anterior knee excursion  Bilateral Leg Squat:   Moderate loss of control, femoral IR and excessive anterior knee excursion              General     ROS    Assessment/Plan:    Patient is a 36 year old male with left side knee complaints.    Patient has the following significant findings with corresponding treatment plan.                Diagnosis 1:  Left knee pain; PFPS  Pain -  hot/cold therapy, manual therapy, self management, education and home program  Decreased strength - therapeutic exercise, therapeutic activities and home program  Impaired muscle performance - neuro re-education and home program  Decreased function - therapeutic activities and home program    Therapy Evaluation Codes:   1) History comprised of:   Personal factors that impact the plan of care:      None.    Comorbidity factors that impact the plan of care are:      None.     Medications impacting care: None.  2) Examination of Body Systems comprised of:   Body structures and functions that impact the plan of care:      Knee.   Activity limitations that impact the plan of care are:      Running and  Walking.  3) Clinical presentation characteristics are:   Stable/Uncomplicated.  4) Decision-Making    Low complexity using standardized patient assessment instrument and/or measureable assessment of functional outcome.  Cumulative Therapy Evaluation is: Low complexity.    Previous and current functional limitations:  (See Goal Flow Sheet for this information)    Short term and Long term goals: (See Goal Flow Sheet for this information)     Communication ability:  Patient appears to be able to clearly communicate and understand verbal and written communication and follow directions correctly.  Treatment Explanation - The following has been discussed with the patient:   RX ordered/plan of care  Anticipated outcomes  Possible risks and side effects  This patient would benefit from PT intervention to resume normal activities.   Rehab potential is good.    Frequency:  1 X week, once daily  Duration:  for 6 weeks  Discharge Plan:  Achieve all LTG.  Independent in home treatment program.  Reach maximal therapeutic benefit.    Please refer to the daily flowsheet for treatment today, total treatment time and time spent performing 1:1 timed codes.

## 2021-04-22 NOTE — LETTER
DILMA [facility]  [address]  [address]  [place] [address]  [phone]    2021    Re: Roberto Marvin   :   1985  MRN:  [id number]   REFERRING PHYSICIAN:   MD DILMA Lay [facility]  Date of Initial Evaluation:  2021  Visits:  Rxs Used: 1  Reason for Referral:     Pain of left patellofemoral joint  Patellofemoral pain syndrome    EVALUATION SUMMARY    Physical Therapy Initial Evaluation  Subjective:    Patient Health History  Roberto Marvin being seen for Pain in left knee.   Problem began: 5/15/2019.   Problem occurred: Over time   Pain is reported as 1/10 on pain scale.  General health as reported by patient is excellent.  Pertinent medical history includes: asthma.     Surgeries include:  Other. Other surgery history details: Laparoscopic Appendix Removal.    Current medications:  None.    Current occupation is [employer].   Primary job tasks include:  Computer work.                Therapist Generated HPI Evaluation  Problem details: Patient reports the onset of left knee pain one year ago after increasing his running distance. He reports the left anterior knee pain occurs mostly after running and after long days at work standing..         Type of problem:  Left knee.    This is a chronic condition.  Condition occurred with:  Running.  Where condition occurred: in the community.  Patient reports pain:  Anterior and medial.  Pain is described as aching and is intermittent.  Pain radiates to:  Knee. Pain is worse during the day.  Since onset symptoms are gradually worsening.  Associated with: none. Exacerbated by: running, walking long distances.    Special tests included:  X-ray (see eval).    Re: Roberto Marvin   :   1985    Restrictions due to condition include:  Working in normal job without restrictions ().  Barriers include:  None  as reported by patient.    Objective:  Standing Alignment:    Ankle/Foot:  Pes planus L and pes planus R    Gait:    Gait Type:  Normal          Hip Evaluation  HIP AROM:  AROM:    Left Hip:     Normal    Right Hip:   Normal      Hip Strength:    Flexion:   Left: 4+/5   Pain:  Right: 4+/5   Pain:              Extension:  Left: 4/5  Pain:Right: 4/5    Pain:    Abduction:  Left: 4-/5     Pain:Right: 4-/5    Pain:  Adduction:  Left: 4+/5    Pain:Right: 4+/5   Pain:  Internal Rotation:  Left: 4+/5    Pain:Right: 4+/5   Pain:  External Rotation:  Left: 4-/5   Pain:  Right: 4-/5   Pain:       Knee Evaluation:  ROM:  AROM: normal  Strength wnl knee: grossly 4+/5 bilaterally.    Strength:   Quad Set Left: Good    Pain:   Quad Set Right: Good    Pain:    Ligament Testing:  Normal    Special Tests:   Left knee negative for the following special tests:  Meninscal (mild pain with thesslys, negative mccmurrays and applys)    Palpation:  Normal  Edema:  Normal  Mobility Testing:  Normal    Functional Testing:    Quad:    Single Leg Squat:  Left:       Moderate loss of control, femoral IR and excessive anterior knee excursion  Right:       Moderate loss of control, femoral IR and excessive anterior knee excursion  Bilateral Leg Squat:   Moderate loss of control, femoral IR and excessive anterior knee excursion        Re: Roberto Marvin   :   1985    Assessment/Plan:    Patient is a 36 year old male with left side knee complaints.    Patient has the following significant findings with corresponding treatment plan.                Diagnosis 1:  Left knee pain; PFPS  Pain -  hot/cold therapy, manual therapy, self management, education and home program  Decreased strength - therapeutic exercise, therapeutic activities and home program  Impaired muscle performance - neuro re-education and home program  Decreased function - therapeutic activities and home program    Therapy Evaluation Codes:   1) History comprised of:   Personal  factors that impact the plan of care:      None.    Comorbidity factors that impact the plan of care are:      None.     Medications impacting care: None.  2) Examination of Body Systems comprised of:   Body structures and functions that impact the plan of care:      Knee.   Activity limitations that impact the plan of care are:      Running and Walking.  3) Clinical presentation characteristics are:   Stable/Uncomplicated.  4) Decision-Making    Low complexity using standardized patient assessment instrument and/or measureable assessment of functional outcome.  Cumulative Therapy Evaluation is: Low complexity.    Previous and current functional limitations:  (See Goal Flow Sheet for this information)    Short term and Long term goals: (See Goal Flow Sheet for this information)     Communication ability:  Patient appears to be able to clearly communicate and understand verbal and written communication and follow directions correctly.  Treatment Explanation - The following has been discussed with the patient:   RX ordered/plan of care  Anticipated outcomes  Possible risks and side effects  This patient would benefit from PT intervention to resume normal activities.   Rehab potential is good.    Frequency:  1 X week, once daily  Duration:  for 6 weeks  Discharge Plan:  Achieve all LTG.  Independent in home treatment program.  Reach maximal therapeutic benefit.    Please refer to the daily flowsheet for treatment today, total treatment time and time spent performing 1:1 timed codes.         Re: Roberto Marvin   :   1985      Thank you for your referral.    INQUIRIES  Therapist: Raghavendra Pérez, PT, DPT   [facility] SERVICES [address] [address]  Phone: [phone]  Fax: [phone]

## 2021-04-29 ENCOUNTER — THERAPY VISIT (OUTPATIENT)
Dept: PHYSICAL THERAPY | Facility: CLINIC | Age: 36
End: 2021-04-29
Payer: COMMERCIAL

## 2021-04-29 DIAGNOSIS — M22.2X9 PATELLOFEMORAL PAIN SYNDROME: Primary | ICD-10-CM

## 2021-04-29 PROCEDURE — 97110 THERAPEUTIC EXERCISES: CPT | Mod: GP | Performed by: PHYSICAL THERAPIST

## 2021-04-29 PROCEDURE — 97530 THERAPEUTIC ACTIVITIES: CPT | Mod: GP | Performed by: PHYSICAL THERAPIST

## 2021-06-18 NOTE — PROGRESS NOTES
Discharge Note    Progress reporting period is from last progress note on   to Apr 29, 2021.    Roberto failed to follow up and current status is unknown.  Please see information below for last relevant information on current status.  Patient seen for 2 visits.    SUBJECTIVE  Subjective changes noted by patient:  Patient reports running 2 miles 3 separate times since last PT sessiona nd did not feel pain during or after.   .  Current pain level is 3/10.     Previous pain level was  4/10.   Changes in function:  Yes (See Goal flowsheet attached for changes in current functional level)  Adverse reaction to treatment or activity: None    OBJECTIVE  Changes noted in objective findings: shahla 160 spm. hip drop R>L at midstance.      ASSESSMENT/PLAN  Diagnosis: Left knee pain   Updated problem list and treatment plan:   HEP  STG/LTGs have been met or progress has been made towards goals:  Yes, please see goal flowsheet for most current information  Assessment of Progress: current status is unknown.    Last current status:     Self Management Plans:  HEP  I have re-evaluated this patient and find that the nature, scope, duration and intensity of the therapy is appropriate for the medical condition of the patient.  Roberto continues to require the following intervention to meet STG and LTG's:  HEP.    Recommendations:  Discharge with current home program.  Patient to follow up with MD as needed.    Please refer to the daily flowsheet for treatment today, total treatment time and time spent performing 1:1 timed codes.

## 2021-10-10 ENCOUNTER — HEALTH MAINTENANCE LETTER (OUTPATIENT)
Age: 36
End: 2021-10-10

## 2021-10-19 PROBLEM — F32.9 MAJOR DEPRESSION: Status: RESOLVED | Noted: 2019-08-15 | Resolved: 2021-04-05

## 2021-12-16 ENCOUNTER — IMMUNIZATION (OUTPATIENT)
Dept: NURSING | Facility: CLINIC | Age: 36
End: 2021-12-16
Payer: COMMERCIAL

## 2021-12-16 PROCEDURE — 0004A PR COVID VAC PFIZER DIL RECON 30 MCG/0.3 ML IM: CPT

## 2021-12-16 PROCEDURE — 91300 PR COVID VAC PFIZER DIL RECON 30 MCG/0.3 ML IM: CPT

## 2022-02-17 ENCOUNTER — OFFICE VISIT (OUTPATIENT)
Dept: FAMILY MEDICINE | Facility: CLINIC | Age: 37
End: 2022-02-17
Payer: COMMERCIAL

## 2022-02-17 VITALS
RESPIRATION RATE: 18 BRPM | SYSTOLIC BLOOD PRESSURE: 134 MMHG | DIASTOLIC BLOOD PRESSURE: 75 MMHG | WEIGHT: 187 LBS | HEART RATE: 72 BPM | HEIGHT: 71 IN | BODY MASS INDEX: 26.18 KG/M2 | TEMPERATURE: 98.4 F | OXYGEN SATURATION: 99 %

## 2022-02-17 DIAGNOSIS — K42.0 UMBILICAL HERNIA WITH OBSTRUCTION: Primary | ICD-10-CM

## 2022-02-17 PROCEDURE — 99213 OFFICE O/P EST LOW 20 MIN: CPT | Performed by: FAMILY MEDICINE

## 2022-02-17 ASSESSMENT — PATIENT HEALTH QUESTIONNAIRE - PHQ9: SUM OF ALL RESPONSES TO PHQ QUESTIONS 1-9: 2

## 2022-02-17 NOTE — PROGRESS NOTES
"  Assessment & Plan     Umbilical hernia with obstruction  Advised avoid weight Lifting 2 weeks  If any further Problems or pain follow up     Return in about 3 months (around 5/17/2022) for Physical Exam.    MD DILMA [facility] HASMUKH Marvin is a 37 year old who presents for the following health issues}    HPI     Concern - mass umbilical area  Onset: 3 weeks ago  Description: started as nickel size now it is going down, hurt at first but now getting better   Intensity: mild  Progression of Symptoms:  improving  Accompanying Signs & Symptoms: none  Previous history of similar problem: none  Precipitating factors:        Worsened by: putting any pressure on it  Alleviating factors:        Improved by: not touching  Therapies tried and outcome:  none   Pt says he does weight Lifting   But now has no pain with exercise or activity    Review of Systems   Rest of the ROS is Negative except see above and Problem list [stable]  No nausea or vomiting or abd pain      Objective    /75   Pulse 72   Temp 98.4  F (36.9  C) (Oral)   Resp 18   Ht 1.813 m (5' 11.38\")   Wt 84.8 kg (187 lb)   SpO2 99%   BMI 25.80 kg/m    Body mass index is 25.8 kg/m .  Physical Exam   GENERAL: healthy, alert and no distress  ABDOMEN: soft, nontender and small Umbilical hernia-no tenderness , BS +            "

## 2022-02-17 NOTE — PATIENT INSTRUCTIONS
Patient Education     Hernia (Adult)    A hernia can happen when there is a weakness or defect in the wall of the abdomen or groin. Intestines or nearby tissues may move from their usual location and push through the weakness in the wall. This can cause a hernia (bulge) you may see or feel.  Causes and risk factors   A hernia may be present at birth. Or it may be caused by the wear and tear of daily living. Certain factors can make a hernia more likely. These can include:    Heavy lifting    Straining, whether from lifting, movement, or constipation    Chronic cough    Injury to the abdominal wall    Excess weight    Pregnancy    Prior surgery    Older age    Family history of hernia  Symptoms  Symptoms of a hernia may come on suddenly. Or they may appear slowly over time. Some common symptoms include:    Bulge in the groin area, around the navel, or in the scrotum (the bulge may get bigger when you stand and go away when you lie down)    Pain or pressure around the bulge    Pain during activities such as lifting, coughing, or sneezing    A feeling of weakness or pressure in the groin    Pain or swelling in the scrotum  Types of hernias  There are different types of hernia. The type you have depends on its location:    Inguinal. This type is in the groin or scrotum. It is more common in men. But, women can get this hernia, too.    Femoral. This type is in the groin, upper thigh (where the leg bends), or labia. It is more common in women.    Ventral. This type is in the abdominal wall.    Umbilical. This type occurs around the navel (belly button).    Incisional. This type occurs at the site of a previous surgery.  The condition of the hernia can help determine how urgently it needs to be treated.    Reducible. It goes back in by itself, or it can be pushed back in.    Irreducible. It can t be pushed back in.    Incarcerated/strangulated. The intestine is trapped (incarcerated). If this happens, you won t be able  to push the bulge back in. If the incarcerated hernia isn t treated, it may become strangulated. This means the area loses blood supply and the tissue may die. This requires emergency surgery. You need treatment right away.  In most cases, a hernia will not heal on its own.You may need surgery to repair the defect in the abdominal wall or groin. You ll be told more about surgery, if needed.  If your symptoms are not severe, treatment may sometimes be delayed. In such cases, you will need regular follow-up visits with the provider. You ll be asked to keep track of your symptoms and to watch for signs of more serious problems. You may also be given guidelines similar to the home care instructions below.  Home care  To help keep a hernia from getting worse, you may be advised to:    Avoid heavy lifting and straining as directed.    Take steps to prevent constipation, such as eating more fiber and drinking more water. This may help reduce straining that can occur when having a bowel movement. Reducing straining may help keep your symptoms from getting worse.    Maintain a healthy weight or lose excess weight. This can help reduce strain on abdominal muscles and tissues.    Stop smoking. This can help prevent coughing that may also strain abdominal muscles and tissues.  Follow-up care  Follow up with your healthcare provider, or as directed. If imaging tests were done, they will be reviewed a doctor. You will be told the results and any new findings that may affect your care.  When to seek medical advice  Call your healthcare provider right away if any of these occur:    Hernia hardens, swells, or grows larger    Hernia can no longer be pushed back in    Pain moves to the lower right abdomen (just below the waistline), or spreads to the back  Call 911  Call 911 if any of these occur:    Severe pain, redness, or tenderness in the area near the hernia    Pain worsens quickly and doesn t get better    Inability to have a  bowel movement or pass gas    Fever of 100.4 F (38 C) or higher, or as directed by your healthcare provider  Kalee last reviewed this educational content on 3/1/2018    5037-6200 [employer]. All rights reserved. This information is not intended as a substitute for professional medical care. Always follow your healthcare professional's instructions.

## 2022-05-21 ENCOUNTER — HEALTH MAINTENANCE LETTER (OUTPATIENT)
Age: 37
End: 2022-05-21

## 2022-09-18 ENCOUNTER — HEALTH MAINTENANCE LETTER (OUTPATIENT)
Age: 37
End: 2022-09-18

## 2023-06-04 ENCOUNTER — HEALTH MAINTENANCE LETTER (OUTPATIENT)
Age: 38
End: 2023-06-04